# Patient Record
Sex: FEMALE | Race: WHITE | NOT HISPANIC OR LATINO | Employment: FULL TIME | URBAN - METROPOLITAN AREA
[De-identification: names, ages, dates, MRNs, and addresses within clinical notes are randomized per-mention and may not be internally consistent; named-entity substitution may affect disease eponyms.]

---
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
## 2019-08-15 ENCOUNTER — OFFICE VISIT (OUTPATIENT)
Dept: OPHTHALMOLOGY | Facility: CLINIC | Age: 19
End: 2019-08-15
Attending: OPHTHALMOLOGY
Payer: COMMERCIAL

## 2019-08-15 DIAGNOSIS — H55.01 CONGENITAL NYSTAGMUS: ICD-10-CM

## 2019-08-15 DIAGNOSIS — L56.8 PHOTOSENSITIVITY DUE TO SUN: ICD-10-CM

## 2019-08-15 DIAGNOSIS — Q14.1 CONGENITAL HYPOPLASIA OF FOVEA CENTRALIS: ICD-10-CM

## 2019-08-15 DIAGNOSIS — H50.10 MONOCULAR EXOTROPIA: ICD-10-CM

## 2019-08-15 DIAGNOSIS — H54.3 LOW VISION, BOTH EYES: ICD-10-CM

## 2019-08-15 DIAGNOSIS — E70.321 OCA2 (OCULOCUTANEOUS ALBINISM TYPE 2) (H): Primary | ICD-10-CM

## 2019-08-15 PROCEDURE — G0463 HOSPITAL OUTPT CLINIC VISIT: HCPCS | Mod: ZF | Performed by: TECHNICIAN/TECHNOLOGIST

## 2019-08-15 ASSESSMENT — REFRACTION_WEARINGRX
SPECS_TYPE: PAL
OD_ADD: +3.50
OD_CYLINDER: +2.25
OS_CYLINDER: +4.00
OD_SPHERE: +0.50
OS_AXIS: 080
OD_AXIS: 100
OS_ADD: +3.50
OS_SPHERE: +0.25

## 2019-08-15 ASSESSMENT — VISUAL ACUITY
CORRECTION_TYPE: GLASSES
OD_SC+: -1
OD_CC: 20/50
OS_CC+: -2
OS_SC: 20/100
OD_CC+: +2
OD_SC: 20/70
METHOD: SNELLEN - LINEAR
OS_CC: 20/60

## 2019-08-15 NOTE — NURSING NOTE
Chief Complaint(s) and History of Present Illness(es)     Albinism Follow Up     Laterality: both eyes    Comments: Here for DMV form to be filled out, no VA changes, WGFT,  No new concerns

## 2019-08-15 NOTE — PROGRESS NOTES
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 Follow up as planned with Dr. Barajas in May 2020

## 2019-08-23 ENCOUNTER — TELEPHONE (OUTPATIENT)
Dept: OPHTHALMOLOGY | Facility: CLINIC | Age: 19
End: 2019-08-23

## 2019-08-23 NOTE — TELEPHONE ENCOUNTER
Last glasses Rx faxed to [facility] shop per request 8-23-19  Baljeet Flores RN RN 4:10 PM 08/23/19

## 2020-05-21 ENCOUNTER — TELEPHONE (OUTPATIENT)
Dept: OPHTHALMOLOGY | Facility: CLINIC | Age: 20
End: 2020-05-21

## 2020-06-04 ENCOUNTER — OFFICE VISIT (OUTPATIENT)
Dept: OPHTHALMOLOGY | Facility: CLINIC | Age: 20
End: 2020-06-04
Payer: COMMERCIAL

## 2020-06-04 DIAGNOSIS — E70.321 OCA2 (OCULOCUTANEOUS ALBINISM TYPE 2) (H): Primary | ICD-10-CM

## 2020-06-04 DIAGNOSIS — H54.3 LOW VISION, BOTH EYES: ICD-10-CM

## 2020-06-04 DIAGNOSIS — H52.223 REGULAR ASTIGMATISM OF BOTH EYES: ICD-10-CM

## 2020-06-04 DIAGNOSIS — H21.233 IRIS TRANSILLUMINATION OF BOTH EYES: ICD-10-CM

## 2020-06-04 DIAGNOSIS — H50.10 MONOCULAR EXOTROPIA: ICD-10-CM

## 2020-06-04 DIAGNOSIS — H55.01 CONGENITAL NYSTAGMUS: ICD-10-CM

## 2020-06-04 ASSESSMENT — REFRACTION_WEARINGRX
OS_CYLINDER: +4.00
OD_CYLINDER: +2.25
OD_SPHERE: +0.50
OD_AXIS: 100
OD_ADD: +3.50
OS_AXIS: 080
OS_SPHERE: +0.25
OS_ADD: +3.50
SPECS_TYPE: SVL

## 2020-06-04 ASSESSMENT — VISUAL ACUITY
METHOD: SNELLEN - LINEAR
CORRECTION_TYPE: GLASSES
OS_CC+: +
OD_CC: 20/60
OD_CC+: +
OS_CC: 20/70

## 2020-06-04 ASSESSMENT — CONF VISUAL FIELD
OD_NORMAL: 1
METHOD: COUNTING FINGERS
OS_NORMAL: 1

## 2020-06-04 ASSESSMENT — REFRACTION_MANIFEST
OD_CYLINDER: +2.25
OS_ADD: +3.50
OD_ADD: +3.50
OD_SPHERE: +0.25
OS_SPHERE: PLANO
OS_AXIS: 080
OD_AXIS: 100
OS_CYLINDER: +4.00

## 2020-06-04 ASSESSMENT — TONOMETRY
IOP_METHOD: ICARE
OD_IOP_MMHG: 18
OS_IOP_MMHG: 19

## 2020-06-04 ASSESSMENT — SLIT LAMP EXAM - LIDS
COMMENTS: BLOND
COMMENTS: BLOND

## 2020-06-04 ASSESSMENT — CUP TO DISC RATIO
OD_RATIO: 0.1
OS_RATIO: 0.1

## 2020-06-04 NOTE — PROGRESS NOTES
History  HPI     Annual Eye Exam     In both eyes.  Since onset it is stable.  Treatments tried include no treatments.  Pain was noted as 0/10.              Comments     Patient states vision has been stable since last eye exam, both eyes. Denies eye pain or irritation. Does not take eye drops.    Luda Beckett COT 7:58 AM June 4, 2020     Requesting DMV forms          Last edited by Silviano Berg, OD on 6/4/2020  8:30 AM. (History)          Assessment/Plan  (E70.321) OCA2 (oculocutaneous albinism type 2) (H)  (primary encounter diagnosis)  Comment: Has 1 mutation in the P gene; Autosomal recessive  inheritance  Plan:  Educated patient on condition and clinical findings. Continue to monitor annually.    (H52.223) Regular astigmatism of both eyes  Comment: Hyperopic astigmatism both eyes   Plan: REFRACTION         Dispensed spectacle prescription for full time wear, bifocal maintained at previous power. Monitor annually.    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 Monitor annually.    (H50.10) Monocular exotropia - Left Eye  Comment: Asymptomatic, not bothered by cosmesis  Plan:  Monitor annually.    (H54.3) Low vision, both eyes - Both Eyes  Comment: RE: 20/50, LE:  20/60, BE: 20/50   Completed DMV form with restriction to 55 miles per hour, no distance restriction, no daylight restriction   No devices used, reports good vision at near with bifocals.  Plan:  Interested in pursuing trade school for plumbing. Discussed vocational bifocal and good lighting. Monitor annually.    (H55.01) Congenital nystagmus - Both Eyes  Comment: Asymptomatic, no compensatory head tily  Plan:  Monitor annually.    Return to clinic in 1 year for comprehensive eye exam.    Complete documentation of historical and exam elements from today's encounter can  be found in  the full encounter summary report (not reduplicated in this progress  note). I personally obtained the chief complaint(s) and history of present illness. I  confirmed and edited as necessary the review of systems, past medical/surgical  history, family history, social history, and examination findings as documented by  others; and I examined the patient myself. I personally reviewed the relevant tests,  images, and reports as documented above. I formulated and edited as necessary the  assessment and plan and discussed the findings and management plan with the  patient and family.    Silviano Berg OD, FAAO

## 2020-06-04 NOTE — NURSING NOTE
Chief Complaints and History of Present Illnesses   Patient presents with     Annual Eye Exam     Chief Complaint(s) and History of Present Illness(es)     Annual Eye Exam     Laterality: both eyes    Course: stable    Treatments tried: no treatments    Pain scale: 0/10              Comments     Patient states vision has been stable since last eye exam, both eyes. Denies eye pain or irritation. Does not take eye drops.    Luda Beckett COT 7:58 AM June 4, 2020

## 2020-08-28 ENCOUNTER — TELEPHONE (OUTPATIENT)
Dept: OPHTHALMOLOGY | Facility: CLINIC | Age: 20
End: 2020-08-28

## 2020-08-28 NOTE — TELEPHONE ENCOUNTER
Spoke to pt at 1115    Reviewed vision 20/50 with new corrective lenses each eye and would qualify for higher speed limit    Pt states DMV stated 50 miles/hr limit     Reviewed form with patient and patient states the box on form that was checked was with PRESENT glasses and not with NEW Corrective lenses    Will forward to Dr. Berg/faciltiator to update a form and contact pt to review good time to /sign form     Should be able to update form and check with new corrective lenses     Baljeet Flores RN 11:17 AM 08/28/20          [facility]    Phone Message    May a detailed message be left on voicemail: yes     Reason for Call: Other: Pt said she is not sure what to do but would like to get a message to Dr Berg because she said DMV has that her max speed she can go is 60 and it should be 65 not sure if she needs a letter from Dr Berg but she would like to discuss with care team  Please call Pt back to discuss  Thank you,    Action Taken: Message routed to:  [facility]    Travel Screening: Not Applicable

## 2020-08-31 NOTE — TELEPHONE ENCOUNTER
Updated form denoting that with the NEW glasses, Meño should be limited to 55 mph, (rather than the 50 mph which would be recommended in the current glasses).  No distance limitation or daylight restriction recommended.  The form was mailed to the patient's home address

## 2021-10-12 ENCOUNTER — TELEPHONE (OUTPATIENT)
Dept: OPHTHALMOLOGY | Facility: CLINIC | Age: 21
End: 2021-10-12

## 2021-10-12 NOTE — TELEPHONE ENCOUNTER
Unable to reach patient or LVM regarding DMV Form due to the number disconnects with no VMBox.     (Form to be completed for the DMV, the exam must have occurred within the last 6 months. So if patient needs a form completed,  would need to see patient back.)

## 2021-10-12 NOTE — TELEPHONE ENCOUNTER
Spoke with patient's mother, Kayla, regarding DMV Form. (Form to be completed for the DMV, the exam must have occurred within the last 6 months. So if patient needs a form completed,  would need to see patient back.) . Patient's mother will have patient call regarding scheduling. -Per Patient's Mother Kayla

## 2021-10-12 NOTE — TELEPHONE ENCOUNTER
Unable to reach patient's parent or LVM regarding DMV Form due to the number disconnects with no VMBox.     (Form to be completed for the DMV, the exam must have occurred within the last 6 months. So if patient needs a form completed,  would need to see patient back.)

## 2021-12-14 ENCOUNTER — TELEPHONE (OUTPATIENT)
Dept: OPHTHALMOLOGY | Facility: CLINIC | Age: 21
End: 2021-12-14
Payer: COMMERCIAL

## 2021-12-14 NOTE — TELEPHONE ENCOUNTER
[facility] Call Center    Phone Message    May a detailed message be left on voicemail: yes     Reason for Call: Form or Letter   Type or form/letter needing completion: Vision Report   Provider: Dr. Silviano Berg  Date form needed: ASAP    Patient needs most recent Vision Report for this year so, she can give it to DMV.    If clinic knows Fax or has form can you send to there/DMV?    Please mail a copy to patient as well    Once completed: Mail form to Name: Patient, at Address: same as chart      Action Taken: Message routed to:  [facility] [[id number]]    Travel Screening: Not Applicable

## 2022-01-06 ENCOUNTER — TELEPHONE (OUTPATIENT)
Dept: OPHTHALMOLOGY | Facility: CLINIC | Age: 22
End: 2022-01-06
Payer: COMMERCIAL

## 2022-01-06 NOTE — TELEPHONE ENCOUNTER
I returned the patient's call.  She needs a sooner DMV form filled out.  Able to schedule next week.  The patient is aware of location/date.

## 2022-01-06 NOTE — TELEPHONE ENCOUNTER
[facility] Call [facility]    Phone Message    May a detailed message be left on voicemail: yes     Reason for Call: Other: Pt called stating she has been trying to get a vision report for the DMV for over a month now with no success. She was never told she needed an Appt in December and now the DMV told her that her driving privileges will be taken away on January 15th without the vision report. 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 Her Appt on Jan 29th is too late per Pt. Please call and discuss with Pt scheduling options. Thank you.      Action Taken: Message routed to:  [facility]    Travel Screening: Not Applicable

## 2022-01-11 NOTE — PROGRESS NOTES
HPI  Meño Loaiza is a 21 year old female here for comprehensive eye exam.  Vision is stable.  Needs dmv form filled out. Using hats and sunglasses as before to manage photophobia.      PMH:    Past Medical History:   Diagnosis Date     Nystagmus      OCA2 (oculocutaneous albinism type 2) (H)      Strabismus       POH:  OCA2, congential nystagmus, glasses for astigmatism, left exotropia, no surgery, no trauma  Oc Meds:  none  FH:  Sister nystagmus      Assessment & Plan      (E70.321) OCA2 (oculocutaneous albinism type 2) (H)  (primary encounter diagnosis)  Comment: Has 1 mutation in the P gene; Autosomal recessive  inheritance  Plan:  Educated patient on stable condition and clinical findings. Continue to monitor q 1-2 yrs.    (H52.223) Regular astigmatism of both eyes  Comment: mild change, small improvement in best corrected visual acuity   Plan: manifest refraction done and prescription for glasses given, bifocal maintained at previous power.     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 Monitor annually.    (H50.10) Monocular exotropia - Left Eye  Comment: Asymptomatic, not bothered by cosmesis  Plan:  Monitor annually.    (H54.3) Low vision, both eyes - Both Eyes  Comment: RE: 20/50, LE:  20/60-2, BE: 20/50  Plan:  Completed DMV form with restriction to 55 miles per hour, no distance restriction, no daylight restriction.  No devices used, reports good vision at near with bifocals.    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   -----------------------------------------------------------------------------------    Patient disposition:   Return in about 18  months (around 7/12/2023) for Comprehensive Exam. Call for sooner appointment as needed.    Complete documentation of historical and exam elements from today's encounter can be found in the full encounter summary report (not reduplicated in this progress note). I personally obtained the chief complaint(s) and history of present illness.  I have confirmed and edited as necessary the CC, HPI, PMH/PSH, social history, FMH, ROS, and exam/neuro findings as obtained by the technician or others. I have examined this patient myself and I personally viewed the image(s) and studies listed above and the documentation reflects my findings and interpretation.  I formulated and edited as necessary the assessment and plan and discussed the findings and management plan with the patient and family.     Lyndsey Bradley MD

## 2022-01-12 ENCOUNTER — OFFICE VISIT (OUTPATIENT)
Dept: OPHTHALMOLOGY | Facility: CLINIC | Age: 22
End: 2022-01-12
Attending: OPHTHALMOLOGY
Payer: COMMERCIAL

## 2022-01-12 DIAGNOSIS — E70.321 OCA2 (OCULOCUTANEOUS ALBINISM TYPE 2) (H): Primary | ICD-10-CM

## 2022-01-12 DIAGNOSIS — H54.3 LOW VISION, BOTH EYES: ICD-10-CM

## 2022-01-12 DIAGNOSIS — H21.233 IRIS TRANSILLUMINATION OF BOTH EYES: ICD-10-CM

## 2022-01-12 DIAGNOSIS — H52.223 REGULAR ASTIGMATISM OF BOTH EYES: ICD-10-CM

## 2022-01-12 DIAGNOSIS — H50.10 MONOCULAR EXOTROPIA: ICD-10-CM

## 2022-01-12 PROCEDURE — 92004 COMPRE OPH EXAM NEW PT 1/>: CPT | Performed by: OPHTHALMOLOGY

## 2022-01-12 PROCEDURE — G0463 HOSPITAL OUTPT CLINIC VISIT: HCPCS | Mod: 25

## 2022-01-12 PROCEDURE — 92015 DETERMINE REFRACTIVE STATE: CPT

## 2022-01-12 PROCEDURE — 92081 LIMITED VISUAL FIELD XM: CPT | Performed by: OPHTHALMOLOGY

## 2022-01-12 ASSESSMENT — REFRACTION_WEARINGRX
OS_ADD: +3.50
OD_SPHERE: +0.25
OS_CYLINDER: +4.00
OD_SPHERE: +1.00
OD_CYLINDER: +2.25
OD_CYLINDER: +2.25
OD_ADD: +3.50
OS_CYLINDER: +4.00
OD_AXIS: 100
OS_SPHERE: PLANO
OS_AXIS: 080
SPECS_TYPE: SVL
OS_SPHERE: +0.50
OD_AXIS: 074
OS_AXIS: 086

## 2022-01-12 ASSESSMENT — TONOMETRY
IOP_METHOD: ICARE
OS_IOP_MMHG: 17
OD_IOP_MMHG: 19

## 2022-01-12 ASSESSMENT — REFRACTION_MANIFEST
OD_AXIS: 094
OS_SPHERE: -0.25
OD_ADD: +3.50
OD_SPHERE: PLANO
OS_AXIS: 077
OS_CYLINDER: +4.25
OS_ADD: +3.50
OD_CYLINDER: +2.50

## 2022-01-12 ASSESSMENT — CUP TO DISC RATIO
OS_RATIO: 0.1
OD_RATIO: 0.1

## 2022-01-12 ASSESSMENT — VISUAL ACUITY
METHOD: SNELLEN - LINEAR
OD_CC: 20/60
OS_CC+: -2
CORRECTION_TYPE: GLASSES
OS_CC: 20/70
OD_CC+: -2+2

## 2022-01-12 ASSESSMENT — CONF VISUAL FIELD
OS_NORMAL: 1
OD_NORMAL: 1
METHOD: COUNTING FINGERS

## 2022-01-12 ASSESSMENT — SLIT LAMP EXAM - LIDS
COMMENTS: BLOND
COMMENTS: BLOND

## 2022-01-12 NOTE — NURSING NOTE
Chief Complaints and History of Present Illnesses   Patient presents with     Annual Eye Exam     Chief Complaint(s) and History of Present Illness(es)     Annual Eye Exam     Laterality: both eyes    Course: stable    Associated symptoms: Negative for flashes, floaters and eye pain    Pain scale: 0/10              Comments     OCA2 (oculocutaneous albinism type 2)  Pt states vision both eyes is stable since last visit.  Ocular meds: None    Rachel Salmon OT 2:33 PM January 12, 2022

## 2022-02-04 ENCOUNTER — TELEPHONE (OUTPATIENT)
Dept: OPHTHALMOLOGY | Facility: CLINIC | Age: 22
End: 2022-02-04
Payer: COMMERCIAL

## 2022-02-04 NOTE — TELEPHONE ENCOUNTER
Glasses rx sent over to [facility] in [place] ([phone]) also called [facility] to let them know prescription was being sent so they may tell the patient if still waiting.  CLINT Mitchell 2/4/2022 3:13 PM

## 2022-02-04 NOTE — TELEPHONE ENCOUNTER
[facility] Call [facility]    Phone Message    May a detailed message be left on voicemail: yes     Reason for Call: Other: Pt called in needing her glasses rx sent over to [facility] in . P [phone] F [phone]. Pt is there now wanting to get glasses. Thank you     Action Taken: Message routed to:  [facility]    Travel Screening: Not Applicable

## 2024-01-10 ENCOUNTER — OFFICE VISIT (OUTPATIENT)
Dept: OPHTHALMOLOGY | Facility: CLINIC | Age: 24
End: 2024-01-10
Payer: COMMERCIAL

## 2024-01-10 DIAGNOSIS — H21.233 IRIS TRANSILLUMINATION OF BOTH EYES: ICD-10-CM

## 2024-01-10 DIAGNOSIS — H54.3 LOW VISION, BOTH EYES: ICD-10-CM

## 2024-01-10 DIAGNOSIS — H50.10 MONOCULAR EXOTROPIA: ICD-10-CM

## 2024-01-10 DIAGNOSIS — E70.321 OCA2 (OCULOCUTANEOUS ALBINISM TYPE 2) (H): Primary | ICD-10-CM

## 2024-01-10 DIAGNOSIS — H52.223 REGULAR ASTIGMATISM OF BOTH EYES: ICD-10-CM

## 2024-01-10 PROCEDURE — 92081 LIMITED VISUAL FIELD XM: CPT | Performed by: OPTOMETRIST

## 2024-01-10 PROCEDURE — 92015 DETERMINE REFRACTIVE STATE: CPT | Performed by: OPTOMETRIST

## 2024-01-10 PROCEDURE — 92004 COMPRE OPH EXAM NEW PT 1/>: CPT | Performed by: OPTOMETRIST

## 2024-01-10 ASSESSMENT — REFRACTION_WEARINGRX
OS_AXIS: 080
OD_AXIS: 093
OD_ADD: +3.50
OS_SPHERE: PLANO
OS_ADD: +3.50
OD_SPHERE: PLANO
OS_CYLINDER: +3.75
OD_CYLINDER: +2.50

## 2024-01-10 ASSESSMENT — REFRACTION_MANIFEST
OD_ADD: +3.50
OS_AXIS: 080
OS_SPHERE: -0.50
OD_CYLINDER: +2.50
OD_SPHERE: -0.50
OS_CYLINDER: +3.75
OD_AXIS: 093
OS_ADD: +3.50

## 2024-01-10 ASSESSMENT — CONF VISUAL FIELD
OS_SUPERIOR_TEMPORAL_RESTRICTION: 0
OS_INFERIOR_TEMPORAL_RESTRICTION: 0
OD_INFERIOR_TEMPORAL_RESTRICTION: 0
OS_SUPERIOR_NASAL_RESTRICTION: 0
OD_NORMAL: 1
OS_INFERIOR_NASAL_RESTRICTION: 0
OS_NORMAL: 1
METHOD: COUNTING FINGERS
OD_SUPERIOR_TEMPORAL_RESTRICTION: 0
OD_INFERIOR_NASAL_RESTRICTION: 0
OD_SUPERIOR_NASAL_RESTRICTION: 0

## 2024-01-10 ASSESSMENT — VISUAL ACUITY
OD_SC: 20/70
OS_CC: J2+2
OD_CC: J1+ -2
OS_CC: 20/70
OD_CC: 20/60
OS_SC: 20/80
OS_CC+: +2
OD_CC+: +2
METHOD: SNELLEN - LINEAR

## 2024-01-10 ASSESSMENT — CUP TO DISC RATIO
OD_RATIO: 0.1
OS_RATIO: 0.1

## 2024-01-10 ASSESSMENT — TONOMETRY
OS_IOP_MMHG: 18
OD_IOP_MMHG: 20
IOP_METHOD: ICARE

## 2024-01-10 ASSESSMENT — SLIT LAMP EXAM - LIDS
COMMENTS: BLOND
COMMENTS: BLOND

## 2024-01-10 NOTE — PROGRESS NOTES
History  HPI    Here for CE and DMV form to be filled out, MARY: 2021 currently wears glasses all the time. Noted stable blurry vision same to last eye exam. Light sensitivity. Denies diplopia, dry eyes     Hx OCA2  Last edited by Jayesh Daley on 1/10/2024  1:36 PM.          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   Plan: [place] DMV      Educated patient on condition and clinical findings. Completed DMV form with restriction to 55 miles per hour, no distance restriction, no daylight restriction. Continue to monitor annually.    (H50.10) Monocular exotropia - Left Eye  Comment: Patient has recently become aware and is interested in pursuing treatment  Plan:  Referred to Dr. Barajas for strabismus consultation.    (H52.223) Regular astigmatism of both eyes  Comment: Mixed astigmatism both eyes, stable  Plan: REFRACTION   Dispensed spectacle prescription for full time wear. Monitor annually.    Complete documentation of historical and exam elements from today's encounter can  be found in the full encounter summary report (not reduplicated in this progress  note). I personally obtained the chief complaint(s) and history of present illness. I  confirmed and edited as necessary the review of systems, past medical/surgical  history, family history, social history, and examination findings as documented by  others; and I examined the patient myself. I personally reviewed the relevant tests,  images, and reports as documented above. I formulated and edited as necessary the  assessment and plan and discussed the findings and management plan with the  patient and family.    Silviano Berg, SHAMAR, FAAO

## 2024-01-20 ENCOUNTER — HEALTH MAINTENANCE LETTER (OUTPATIENT)
Age: 24
End: 2024-01-20

## 2024-02-14 NOTE — PROGRESS NOTES
Chief Complaints and History of Present Illnesses   Patient presents with     Albinism Follow Up     h/o OCA2. Wearing glasses full time, vision and nystagmus is stable per patient. Needs DMV form filled out, has permit and has been unable to get license. Drives mostly during the day, not as comfortable with dimmer light. XT not noticed by patient or others. Wears hat when outside in bright sun.      Review of systems for the eyes was negative other than the pertinent positives and negatives noted in the HPI.   History is obtained from the patient         CC:  f/u albinism (OCA2 with 1 mutation in the P gene)  HPI:  Glasses:  wears full-time; likes bifocal  Vision:  problem at distance  Photosensitivity:   Uses Transitions lenses, cap, hat   closes his left eye or both eyes with bright light  Sunscreen used, no sunburn  Strabismus not noted  Abnormal head posture not noted  Nystagmus stable  At [employer], and making good grades; will transfer to [employer] next year  No new medical problems      Assessment & Plan    Meño Loaiza is a 18 year old female who presents with:     OCA2 (oculocutaneous albinism type 2) (H)  Has 1 mutation in the P gene  Autosomal recessive  inheritance    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    Regular astigmatism, bilateral  Greater in the left eye  New glasses prescription given  Continue with bifocal and Transitions lenses    Monocular exotropia - Left Eye  Left exotropia measuring 12 prism diopters at distance and 15 prism diopters at near  Will monitor    Low vision, both eyes - Both Eyes  RE: 20/50  LE:  20/70  BE: 20/50  With new glasses prescription  Completed DMV form with restriction to 50 miles per hour    Congenital nystagmus - Both Eyes  Related to albinism    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  Return in about 2 years (around 5/30/2020) for Dr. Lamar Barajas.   Patient Instructions   New glasses prescription   DMV form completed    Thanks for allowing me to provide your eye care.  I wish you all the best in life.    RAVINDRA Morrison MD        Attending Physician Attestation:  Complete documentation of historical and exam elements from today's encounter can be found in the full encounter summary report (not reduplicated in this progress note).  I personally obtained the chief complaint(s) and history of present illness.  I confirmed and edited as necessary the review of systems, past medical/surgical history, family history, social history, and examination findings as documented by others; and I examined the patient myself.  I personally reviewed the relevant tests, images, and reports as documented above.  I formulated and edited as necessary the assessment and plan and discussed the findings and management plan with the patient and family. - RAVINDRA Morrison MD        " 2-3x/week

## 2024-02-28 ENCOUNTER — OFFICE VISIT (OUTPATIENT)
Dept: OPHTHALMOLOGY | Facility: CLINIC | Age: 24
End: 2024-02-28
Attending: OPHTHALMOLOGY
Payer: COMMERCIAL

## 2024-02-28 ENCOUNTER — TELEPHONE (OUTPATIENT)
Dept: OPHTHALMOLOGY | Facility: CLINIC | Age: 24
End: 2024-02-28
Payer: COMMERCIAL

## 2024-02-28 DIAGNOSIS — H21.233 IRIS TRANSILLUMINATION OF BOTH EYES: ICD-10-CM

## 2024-02-28 DIAGNOSIS — H50.10 MONOCULAR EXOTROPIA: Primary | ICD-10-CM

## 2024-02-28 DIAGNOSIS — H54.52A1: ICD-10-CM

## 2024-02-28 DIAGNOSIS — E70.321 OCA2 (OCULOCUTANEOUS ALBINISM TYPE 2) (H): ICD-10-CM

## 2024-02-28 PROCEDURE — 99214 OFFICE O/P EST MOD 30 MIN: CPT | Performed by: OPHTHALMOLOGY

## 2024-02-28 PROCEDURE — 99213 OFFICE O/P EST LOW 20 MIN: CPT | Performed by: OPHTHALMOLOGY

## 2024-02-28 PROCEDURE — 92060 SENSORIMOTOR EXAMINATION: CPT | Performed by: OPHTHALMOLOGY

## 2024-02-28 ASSESSMENT — SLIT LAMP EXAM - LIDS
COMMENTS: BLOND LASHES
COMMENTS: BLOND LASHES

## 2024-02-28 ASSESSMENT — REFRACTION_WEARINGRX
OS_SPHERE: -0.50
OS_ADD: +3.50
OS_CYLINDER: +4.00
OD_ADD: +3.50
OD_CYLINDER: +2.50
OD_AXIS: 095
SPECS_TYPE: BIFOCAL
OS_AXIS: 080
OD_SPHERE: -0.50

## 2024-02-28 ASSESSMENT — VISUAL ACUITY
OS_CC: 20/100
OD_CC+: +
CORRECTION_TYPE: GLASSES
METHOD: SNELLEN - LINEAR
OS_CC+: +
OD_CC: 20/60

## 2024-02-28 ASSESSMENT — EXTERNAL EXAM - RIGHT EYE: OD_EXAM: BLOND HAIR

## 2024-02-28 NOTE — LETTER
"2/28/2024    To: Silviano Berg, OD  [address] [address]    Re:  Meño Loaiza    YOB: 2000    MRN: [id number]    Dear Colleague,     It was my pleasure to see Meño on 2/28/2024.  In summary, Meño Loaiza is a 24 year old female who presents with:     Monocular exotropia  OCA2 (oculocutaneous albinism type 2) (H24)  Low vision, both eyes  Iris transillumination of both eyes    Referred by Dr. Berg for left exotropia repair consultation. Meño is bothered by the psychosocial impact of her constant left exotropia and desires surgical repair. Large exotropia secondary to positive angle kappa with moderate angle on alternating cover testing. Tolerates 20 base in prism correction before is diplopic.   - I recommend eye muscle surgery. Today with Meño, I reviewed the indications, risks, benefits, and alternatives of eye muscle surgery including, but not limited to, failure obtain the desired ocular alignment (\"over\" or \"under\" correction), diplopia, and damage to any structure in or around the eye that may necessitate treatment with medicine, laser, or surgery. I further explained that the goal of surgery is to help control Meño's strabismus. Surgery will not \"cure\" Meño's strabismus or resolve/prevent the need for refractive correction. Additional strabismus surgery may be required in the short or long term. I emphasized that regular follow-up to monitor and optimize her vision and alignment would be necessary. I also discussed that trainees would be involved in Meño's surgery under my direct supervision. We also discussed the risks of surgical injury, bleeding, and infection which may necessitate further medical or surgical treatment and which may result in diplopia, loss of vision, blindness, or loss of the eye(s) in less than 1% of cases and the remote possibility of permanent damage to any organ system or death with the use of general anesthesia.  I explained that " we would hide visible scars as much as possible in natural creases but that every patient heals and pigments differently resulting in a variable degree of scarring to the eyes or surrounding facial structures after surgery.  I provided multiple opportunities for questions, answered all questions to the best of my ability, and confirmed that my answers and my discussion were understood.  Plan for bilateral lateral rectus recession.   Reassess at preop if we can push her correction closer to her krimsky angle.   - Provided ack lab survey information (was previously involved with [facility] study but nystagmus limited participation).      Thank you for the opportunity to care for Meño. I have asked her to Return for Schedule Surgery.  Until then, please do not hesitate to contact me or my clinic with any questions or concerns.          Warm regards,          Lamar Barajas MD                 Pediatric Ophthalmology & Strabismus        Department of Ophthalmology & Visual Neurosciences        [facility]   CC:  MD Meño Gongora

## 2024-02-28 NOTE — NURSING NOTE
Chief Complaint(s) and History of Present Illness(es)       Albinism Follow Up              Treatments tried: glasses    Comments: Recently updated Rx after LV with Dr. Berg, no VA concerns               Exotropia Follow Up              Laterality: left eye    Onset: present since childhood    Treatments tried: glasses    Comments: Interested in strab sx for LXT, alignment seems stable               Comments    History of Albinism:  Photosensitivity:  Always  Filtering glasses/cap: Always  Nystagmus: yes, manifest   Visual development: Abnormal: 20/50  Holds near objects closely: Yes  Head posture:  Normal  Hair color at birth: light blonde   Gene testing:  OCA2  Tan line: No  Use of sunscreen: Yes  History of bruising/easy bleeding/epistaxis: No  Ethnicity:  White     Inf pt

## 2024-02-28 NOTE — PROGRESS NOTES
"Chief Complaint(s) and History of Present Illness(es)       Albinism Follow Up    Treatments tried include glasses. Additional comments: Recently updated Rx after LV with Dr. Berg, no [facility] concerns              Exotropia Follow Up    In left eye.  Disease is present since childhood.  Treatments tried include glasses. Additional comments: Interested in strab sx for LXT, alignment seems stable              Comments    History of Albinism:  Photosensitivity:  Always  Filtering glasses/cap: Always  Nystagmus: yes, manifest   Visual development: Abnormal: 20/50  Holds near objects closely: Yes  Head posture:  Normal  Hair color at birth: light blonde   Gene testing:  OCA2  Tan line: No  Use of sunscreen: Yes  History of bruising/easy bleeding/epistaxis: No  Ethnicity:  White     Inf pt                  Review of systems for the eyes was negative other than the pertinent positives and negatives noted in the HPI.    History is obtained from the patient.     Primary care: Braden Ahn   Referring provider: No ref. provider found  [facility] home  Assessment & Plan   Meño Loaiza is a 24 year old female who presents with:     Monocular exotropia  OCA2 (oculocutaneous albinism type 2) (H24)  Low vision, both eyes  Iris transillumination of both eyes    Referred by Dr. Berg for left exotropia repair consultation. Meño is bothered by the psychosocial impact of her constant left exotropia and desires surgical repair. Large exotropia secondary to positive angle kappa with moderate angle on alternating cover testing. Tolerates 20 base in prism correction before is diplopic.   - I recommend eye muscle surgery. Today with Meño, I reviewed the indications, risks, benefits, and alternatives of eye muscle surgery including, but not limited to, failure obtain the desired ocular alignment (\"over\" or \"under\" correction), diplopia, and damage to any structure in or around the eye that may necessitate treatment with " "medicine, laser, or surgery. I further explained that the goal of surgery is to help control Meño's strabismus. Surgery will not \"cure\" Meño's strabismus or resolve/prevent the need for refractive correction. Additional strabismus surgery may be required in the short or long term. I emphasized that regular follow-up to monitor and optimize her vision and alignment would be necessary. I also discussed that trainees would be involved in Meño's surgery under my direct supervision. We also discussed the risks of surgical injury, bleeding, and infection which may necessitate further medical or surgical treatment and which may result in diplopia, loss of vision, blindness, or loss of the eye(s) in less than 1% of cases and the remote possibility of permanent damage to any organ system or death with the use of general anesthesia.  I explained that we would hide visible scars as much as possible in natural creases but that every patient heals and pigments differently resulting in a variable degree of scarring to the eyes or surrounding facial structures after surgery.  I provided multiple opportunities for questions, answered all questions to the best of my ability, and confirmed that my answers and my discussion were understood.  Plan for bilateral lateral rectus recession.   Reassess at preop if we can push her correction closer to her krimsky angle.   - Provided [facility] survey information (was previously involved with [facility] study but nystagmus limited participation).        Return for Schedule Surgery.    Patient Instructions   Information on Research for People with Albinism: [facility] - Online survery: [email or web address]      EYE MUSCLE SURGERY  Planning for: bilateral lateral rectus recession      What is strabismus? Strabismus is the medical term for eye muscle incoordination, resulting in either crossed eyes, wandering eyes, or drifting eyes. There are many types of strabismus, and Dr. Barajas and " her team are experts in diagnosing each particular type. Strabismus may cause lack of depth perception, decreased visual field, eye strain, or diplopia (double vision). Other treatments for strabismus include glasses, eye drops, eye muscle exercises, or medical injections; however, if none of these treatments are appropriate or effective for you or your child, surgical correction may be necessary.     What causes strabismus? The cause of strabismus may be poor vision in one or both eyes, paralysis, or weakness of one or more of the eye muscles, scars or injuries to the eye muscles, or (most common) a basic incoordination problem resulting from a weakness in the area of the brain that is responsible for coordination of eye movements. Strabismus surgery in most cases improves the strength and coordination of the eye muscles, but in many cases does not result in a complete cure in the sense that the eyes may not coordinate perfectly in all directions of gaze.     Will surgery correct strabismus? In most cases, surgical treatment of strabismus will result in considerable improvement of the incoordination problem. Seventy percent of patients who have surgery with Dr. Barajas for strabismus will experience significant improvement such that no further surgery is required. About 10% of patients may have incomplete correction in the short term and, in some of these patients, it may be significant enough to require additional surgical correction 3-6 months after the first surgery. About 20-30% of children have very good eye alignment within a few months after surgery but the eyes may drift again over time: months, years, or decades later. This too may require another surgery. Sometimes residual misalignment after surgery can be improved by other treatments.      How do you decide which muscles (which eye) to operate on? The doctor considers several factors, including the alignment of the eyes in different directions of looking  as measured in the office, muscles that are underacting or overacting, and previous surgeries that have been performed. Sometimes it is necessary to operate on  the good eye  to make sure that the eyes remain balanced. Inevitably, the surgical consent will be for BOTH eyes so that Dr. Barajas can test all eye muscles under anesthesia and operate accordingly to give the patient the best possible outcome.     What kind of anesthesia is used? All children have surgery under general anesthesia, meaning that they are completely asleep for the surgery. General anesthesia is begun by breathing medicine from a mask, or by receiving medicine through a small tube that is placed in a blood vessel. All patients receive a tube in the vein, but it is placed after anesthesia is begun with a mask for children who are afraid of needles before they are sleeping. Young children sometimes receive medicine in the Pre-Anesthesia Room, to help them accept the anesthesia more easily. During anesthesia, a tube will be placed in or on the patient's airway (endotracheal tube or larygeal mask airway) for safety. Heart rate and rhythm, breathing rate, blood pressure, oxygen level, and level of anesthetic medicines are constantly monitored by the anesthesia team. Feel free to address any concerns that you have about anesthesia with the anesthesiologist who will be talking with you before surgery. Some adults may have local anesthesia, with medicine placed around the eyeball to numb it.      What should I expect after surgery?    All sutures are dissolvable.  In almost all cases, an eye patch or bandage is not required after surgery.  Sensitivity to light, blurry vision, double vision, foreign body sensation (feeling like the eyes have something in them or are scratchy), aching or sore eyes especially with movement, bloodstained orange/red tears and crusting along the eyelashes are all normal after surgery. These will be the worst for the first  24-48 hours after surgery. As a result, some patients will elect to keep their eyes closed for 1-3 days after surgery. This is normal. Whenever Meño is comfortable, she may open his eyes.    Movies, tablets, and phones may be watched anytime. If glasses are worn, it is ok to keep them off while the eyes are resting and resume wear once the patient is comfortably opening the eyes again in a few days.   Avoid eye pressure, rubbing, straining, and athletics for 1 week. (Don't worry, Dr. Barajas has never seen a child pop a stitch or cause harm despite some inevitable rubbing.)   It is normal for the white part of the eyes to be red/orange/purple and puffy or gelatinous like a gummy bear on the surface of the eye. This is just a bruise and will fade away slowly over a few weeks.   To prevent infection, it is important to keep  dirty  water, sand, and dirt out of the eye after surgery. So, no swimming (lakes or pools), sand, or dirt in the eyes for 2 weeks after surgery. Bathe or shower as usual.  The  muscle ache  discomfort experienced after eye muscle surgery improves significantly over the first 2 to 3 days after surgery. Young children may receive Tylenol or ibuprofen in the usual doses if they seem uncomfortable or irritable. Cool washcloths or ice placed over the eyes can be soothing. Activity is limited only by the individual patient's level of comfort.   An antibiotic eye drop or ointment may be prescribed to use for 1 week after surgery.  Scars are nature's way of healing a surgical wound. The scars are not usually noticeable, unless more than one surgery is required. Techniques are used at the time of surgery to minimize scarring. Scars are located in the thin conjunctiva covering the white of the eye, and are not on the skin of the eyelid.  Meño may return to /school/work whenever comfortable. Surgery is generally on a Thursday. Most patients return on the Monday after surgery.   It takes 1-2 months  "for the eye muscles to fully regain their strength, for the brain to figure out the new system, and for the eye alignment to normalize. During this time, Meño may experience double vision (\"I see 2 mommies/daddies\") and some unsteadiness. After surgery, the eyes may appear to wander in any direction (in, out, up, or down). This is normal and will gradually improve each day. It is hard to wait, but trust that it will improve with time. If Meño is complaining of double vision past 3 weeks after surgery please call Dr. Barajas's clinic to discuss with her team.      Will another surgery be needed?  While every attempt is made to correct the misalignment with just one surgery, more than one surgery may be required.  This is related to the individual's healing after muscle surgery, and other types of misalignment of the eyes that may develop in the future. There is no specific number of surgeries beyond which additional surgeries cannot be performed. There is no specific age beyond which eye muscle surgery cannot be performed.     What are the risks of strabismus surgery? The most common  complication from eye muscle surgery is an under-correction or over-correction of the misalignment that requires additional surgery (on average, about 1 out of 3 patients will need another operation at some time in their life). Other very rare complications include bleeding, infection in the eye, or damage to any structure in or around the eye. These are uncommon, and most often easily treated with no long-term impact to vision. Less than 1% of the time, they could result in permanent loss of vision, blindness, or loss of the eye. This is considered very safe. For context, statistically, you are less safe driving on the highway for 1-2 hours. In addition, surgery may expose the patient to other rare complications such as a reaction to anesthesia (again less than 1% of the time). The anesthesiologist will review these risks prior to " "surgery. If adverse reactions occur, the situation will be handled in the best interest of the patient, even if surgery needs to be postponed.     Dr. Barajas's surgery scheduler, Shalonda, will contact you in the next few business days to schedule surgery. For questions, call [phone].      For a free and informative book on strabismus (eye misalignment disorders), go to: [email or web address]     For more information, see also: [email or web address].[email or web address].[email or web address]     I recommend eye muscle surgery. Today with Meño, I reviewed the indications, risks, benefits, and alternatives of eye muscle surgery including, but not limited to, failure obtain the desired ocular alignment (\"over\" or \"under\" correction), diplopia, and damage to any structure in or around the eye that may necessitate treatment with medicine, laser, or surgery. I further explained that the goal of surgery is to help control Meño's strabismus. Surgery will not \"cure\" Meño's strabismus or resolve/prevent the need for refractive correction. Additional strabismus surgery may be required in the short or long term. I emphasized that regular follow-up to monitor and optimize her vision and alignment would be necessary. We also discussed the risks of surgical injury, bleeding, and infection which may necessitate further medical or surgical treatment and which may result in diplopia, loss of vision, blindness, or loss of the eye(s) in less than 1% of cases and the remote possibility of permanent damage to any organ system or death with the use of general anesthesia.  I explained that we would hide visible scars as much as possible in natural creases but that every patient heals and pigments differently resulting in a variable degree of scarring to the eyes or surrounding facial structures after surgery.  I also discussed that trainees would be involved in Meño's surgery under my direct supervision.  I provided " multiple opportunities for questions, answered all questions to the best of my ability, and confirmed that my answers and my discussion were understood.                Visit Diagnoses & Orders    ICD-10-CM    1. Monocular exotropia  H50.10 Sensorimotor     Case Request: Bilateral strabismus surgery      2. OCA2 (oculocutaneous albinism type 2) (H24)  E70.321       3. Category 1 low vision of left eye with normal vision of right eye  H54.52A1       4. Iris transillumination of both eyes  H21.233          Attending Physician Attestation:  Complete documentation of historical and exam elements from today's encounter can be found in the full encounter summary report (not reduplicated in this progress note).  I personally obtained the chief complaint(s) and history of present illness.  I confirmed and edited as necessary the review of systems, past medical/surgical history, family history, social history, and examination findings as documented by others; and I examined the patient myself.  I personally reviewed the relevant tests, images, and reports as documented above.  I formulated and edited as necessary the assessment and plan and discussed the findings and management plan with the patient and family. - Lamar Barajas MD

## 2024-02-28 NOTE — PATIENT INSTRUCTIONS
Information on Research for People with Albinism: [facility] [facility] - Online survery: [email or web address]      EYE MUSCLE SURGERY  Planning for: bilateral lateral rectus recession      What is strabismus? Strabismus is the medical term for eye muscle incoordination, resulting in either crossed eyes, wandering eyes, or drifting eyes. There are many types of strabismus, and Dr. Barajas and her team are experts in diagnosing each particular type. Strabismus may cause lack of depth perception, decreased visual field, eye strain, or diplopia (double vision). Other treatments for strabismus include glasses, eye drops, eye muscle exercises, or medical injections; however, if none of these treatments are appropriate or effective for you or your child, surgical correction may be necessary.     What causes strabismus? The cause of strabismus may be poor vision in one or both eyes, paralysis, or weakness of one or more of the eye muscles, scars or injuries to the eye muscles, or (most common) a basic incoordination problem resulting from a weakness in the area of the brain that is responsible for coordination of eye movements. Strabismus surgery in most cases improves the strength and coordination of the eye muscles, but in many cases does not result in a complete cure in the sense that the eyes may not coordinate perfectly in all directions of gaze.     Will surgery correct strabismus? In most cases, surgical treatment of strabismus will result in considerable improvement of the incoordination problem. Seventy percent of patients who have surgery with Dr. Barajas for strabismus will experience significant improvement such that no further surgery is required. About 10% of patients may have incomplete correction in the short term and, in some of these patients, it may be significant enough to require additional surgical correction 3-6 months after the first surgery. About 20-30% of children have very good eye alignment  within a few months after surgery but the eyes may drift again over time: months, years, or decades later. This too may require another surgery. Sometimes residual misalignment after surgery can be improved by other treatments.      How do you decide which muscles (which eye) to operate on? The doctor considers several factors, including the alignment of the eyes in different directions of looking as measured in the office, muscles that are underacting or overacting, and previous surgeries that have been performed. Sometimes it is necessary to operate on  the good eye  to make sure that the eyes remain balanced. Inevitably, the surgical consent will be for BOTH eyes so that Dr. Barajas can test all eye muscles under anesthesia and operate accordingly to give the patient the best possible outcome.     What kind of anesthesia is used? All children have surgery under general anesthesia, meaning that they are completely asleep for the surgery. General anesthesia is begun by breathing medicine from a mask, or by receiving medicine through a small tube that is placed in a blood vessel. All patients receive a tube in the vein, but it is placed after anesthesia is begun with a mask for children who are afraid of needles before they are sleeping. Young children sometimes receive medicine in the Pre-Anesthesia Room, to help them accept the anesthesia more easily. During anesthesia, a tube will be placed in or on the patient's airway (endotracheal tube or larygeal mask airway) for safety. Heart rate and rhythm, breathing rate, blood pressure, oxygen level, and level of anesthetic medicines are constantly monitored by the anesthesia team. Feel free to address any concerns that you have about anesthesia with the anesthesiologist who will be talking with you before surgery. Some adults may have local anesthesia, with medicine placed around the eyeball to numb it.      What should I expect after surgery?    All sutures are  dissolvable.  In almost all cases, an eye patch or bandage is not required after surgery.  Sensitivity to light, blurry vision, double vision, foreign body sensation (feeling like the eyes have something in them or are scratchy), aching or sore eyes especially with movement, bloodstained orange/red tears and crusting along the eyelashes are all normal after surgery. These will be the worst for the first 24-48 hours after surgery. As a result, some patients will elect to keep their eyes closed for 1-3 days after surgery. This is normal. Whenever Meño is comfortable, she may open his eyes.    Movies, tablets, and phones may be watched anytime. If glasses are worn, it is ok to keep them off while the eyes are resting and resume wear once the patient is comfortably opening the eyes again in a few days.   Avoid eye pressure, rubbing, straining, and athletics for 1 week. (Don't worry, Dr. Barajas has never seen a child pop a stitch or cause harm despite some inevitable rubbing.)   It is normal for the white part of the eyes to be red/orange/purple and puffy or gelatinous like a gummy bear on the surface of the eye. This is just a bruise and will fade away slowly over a few weeks.   To prevent infection, it is important to keep  dirty  water, sand, and dirt out of the eye after surgery. So, no swimming (lakes or pools), sand, or dirt in the eyes for 2 weeks after surgery. Bathe or shower as usual.  The  muscle ache  discomfort experienced after eye muscle surgery improves significantly over the first 2 to 3 days after surgery. Young children may receive Tylenol or ibuprofen in the usual doses if they seem uncomfortable or irritable. Cool washcloths or ice placed over the eyes can be soothing. Activity is limited only by the individual patient's level of comfort.   An antibiotic eye drop or ointment may be prescribed to use for 1 week after surgery.  Scars are nature's way of healing a surgical wound. The scars are not  "usually noticeable, unless more than one surgery is required. Techniques are used at the time of surgery to minimize scarring. Scars are located in the thin conjunctiva covering the white of the eye, and are not on the skin of the eyelid.  Meño may return to /school/work whenever comfortable. Surgery is generally on a Thursday. Most patients return on the Monday after surgery.   It takes 1-2 months for the eye muscles to fully regain their strength, for the brain to figure out the new system, and for the eye alignment to normalize. During this time, Meño may experience double vision (\"I see 2 mommies/daddies\") and some unsteadiness. After surgery, the eyes may appear to wander in any direction (in, out, up, or down). This is normal and will gradually improve each day. It is hard to wait, but trust that it will improve with time. If Meño is complaining of double vision past 3 weeks after surgery please call Dr. Barajas's clinic to discuss with her team.      Will another surgery be needed?  While every attempt is made to correct the misalignment with just one surgery, more than one surgery may be required.  This is related to the individual's healing after muscle surgery, and other types of misalignment of the eyes that may develop in the future. There is no specific number of surgeries beyond which additional surgeries cannot be performed. There is no specific age beyond which eye muscle surgery cannot be performed.     What are the risks of strabismus surgery? The most common  complication from eye muscle surgery is an under-correction or over-correction of the misalignment that requires additional surgery (on average, about 1 out of 3 patients will need another operation at some time in their life). Other very rare complications include bleeding, infection in the eye, or damage to any structure in or around the eye. These are uncommon, and most often easily treated with no long-term impact to vision. Less " "than 1% of the time, they could result in permanent loss of vision, blindness, or loss of the eye. This is considered very safe. For context, statistically, you are less safe driving on the highway for 1-2 hours. In addition, surgery may expose the patient to other rare complications such as a reaction to anesthesia (again less than 1% of the time). The anesthesiologist will review these risks prior to surgery. If adverse reactions occur, the situation will be handled in the best interest of the patient, even if surgery needs to be postponed.     Dr. Barajas's surgery scheduler, Shalonda, will contact you in the next few business days to schedule surgery. For questions, call [phone].      For a free and informative book on strabismus (eye misalignment disorders), go to: [email or web address]/eyemusclebook     For more information, see also: http://eyewiki.aao.org/Category:Pediatric_Ophthalmology/Strabismus     I recommend eye muscle surgery. Today with Meño, I reviewed the indications, risks, benefits, and alternatives of eye muscle surgery including, but not limited to, failure obtain the desired ocular alignment (\"over\" or \"under\" correction), diplopia, and damage to any structure in or around the eye that may necessitate treatment with medicine, laser, or surgery. I further explained that the goal of surgery is to help control Meño's strabismus. Surgery will not \"cure\" Meño's strabismus or resolve/prevent the need for refractive correction. Additional strabismus surgery may be required in the short or long term. I emphasized that regular follow-up to monitor and optimize her vision and alignment would be necessary. We also discussed the risks of surgical injury, bleeding, and infection which may necessitate further medical or surgical treatment and which may result in diplopia, loss of vision, blindness, or loss of the eye(s) in less than 1% of cases and the remote possibility of permanent damage to any organ " system or death with the use of general anesthesia.  I explained that we would hide visible scars as much as possible in natural creases but that every patient heals and pigments differently resulting in a variable degree of scarring to the eyes or surrounding facial structures after surgery.  I also discussed that trainees would be involved in Meño's surgery under my direct supervision.  I provided multiple opportunities for questions, answered all questions to the best of my ability, and confirmed that my answers and my discussion were understood.

## 2024-03-14 ENCOUNTER — OFFICE VISIT (OUTPATIENT)
Dept: OPHTHALMOLOGY | Facility: CLINIC | Age: 24
End: 2024-03-14
Attending: OPHTHALMOLOGY
Payer: COMMERCIAL

## 2024-03-14 DIAGNOSIS — H55.01 CONGENITAL NYSTAGMUS: ICD-10-CM

## 2024-03-14 DIAGNOSIS — H50.10 MONOCULAR EXOTROPIA: Primary | ICD-10-CM

## 2024-03-14 DIAGNOSIS — Q14.1 CONGENITAL HYPOPLASIA OF FOVEA CENTRALIS: ICD-10-CM

## 2024-03-14 DIAGNOSIS — E70.321 OCA2 (OCULOCUTANEOUS ALBINISM TYPE 2) (H): ICD-10-CM

## 2024-03-14 PROCEDURE — 92060 SENSORIMOTOR EXAMINATION: CPT

## 2024-03-14 ASSESSMENT — CONF VISUAL FIELD
OD_INFERIOR_NASAL_RESTRICTION: 0
OS_NORMAL: 1
OS_SUPERIOR_NASAL_RESTRICTION: 0
METHOD: COUNTING FINGERS
OD_SUPERIOR_TEMPORAL_RESTRICTION: 0
OD_SUPERIOR_NASAL_RESTRICTION: 0
OD_NORMAL: 1
OS_SUPERIOR_TEMPORAL_RESTRICTION: 0
OS_INFERIOR_TEMPORAL_RESTRICTION: 0
OD_INFERIOR_TEMPORAL_RESTRICTION: 0
OS_INFERIOR_NASAL_RESTRICTION: 0

## 2024-03-14 ASSESSMENT — VISUAL ACUITY
CORRECTION_TYPE: GLASSES
OD_CC+: +2
OD_CC: 20/60
OS_CC: 20/70
METHOD: SNELLEN - LINEAR
OS_CC+: -2

## 2024-03-14 ASSESSMENT — REFRACTION_WEARINGRX
SPECS_TYPE: BIFOCAL
OD_ADD: +3.50
OD_CYLINDER: +2.50
OS_SPHERE: -0.50
OS_CYLINDER: +4.00
OD_SPHERE: -0.50
OD_AXIS: 095
OS_AXIS: 080
OS_ADD: +3.50

## 2024-03-14 NOTE — NURSING NOTE
Chief Complaint(s) and History of Present Illness(es)       Exotropia Evaluation              Laterality: left eye    Comments: Eye alignment stable. VA stable.  Inf: pt

## 2024-03-14 NOTE — PATIENT INSTRUCTIONS
Wear Fresnel full time (or at least 50% of day each day) for 1 week. Return to clinic in 1 week to reassess eye alignment.   If you are unable to ignore double vision, okay to cancel appointment.

## 2024-03-14 NOTE — PROGRESS NOTES
Chief Complaint(s) & History of Present Illness  Chief Complaint(s) and History of Present Illness(es)       Exotropia Evaluation              Laterality: left eye    Comments: Eye alignment stable. VA stable.  Inf: pt                      Assessment and Plan:      Meño Loaiza is a 24 year old female who presents with:     Monocular exotropia - Left Eye  Smaller LXT on cover test compared to LV. Krimsky stable. With prism lenses in office, Meño was able to intermittently suppress the second image with 25 BI. Placed 25 BI Fresnel over left lens to wear for the next week to see if suppression become easier. Meño will return in 1 week to check if Fresnel can be increased to 30 BI.  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 Barajas.       PLAN:  Return to [place] clinic on March 21 at 2:30pm to check alignment.  Attending Physician Attestation:  I did not see Meño Loaiza at this encounter, but I was available and reviewed the history, examination, assessment, and plan as documented. I agree with the plan. - Lamar Barajas MD

## 2024-03-27 NOTE — OR NURSING
Attempted Meño twice - Left voicemail on Meño's mobile number on file that case scheduled for 3/28 is canceled.     Meño returned phone call at 0000 - stated understanding. Informed office will reach out to reschedule

## 2024-05-21 RX ORDER — OMEGA-3 FATTY ACIDS/FISH OIL 300-1000MG
400 CAPSULE ORAL EVERY 4 HOURS PRN
COMMUNITY
End: 2024-08-21

## 2024-05-22 ENCOUNTER — ANESTHESIA EVENT (OUTPATIENT)
Dept: SURGERY | Facility: CLINIC | Age: 24
End: 2024-05-22
Payer: COMMERCIAL

## 2024-05-23 ENCOUNTER — ANESTHESIA (OUTPATIENT)
Dept: SURGERY | Facility: CLINIC | Age: 24
End: 2024-05-23
Payer: COMMERCIAL

## 2024-05-23 ENCOUNTER — HOSPITAL ENCOUNTER (OUTPATIENT)
Facility: CLINIC | Age: 24
Discharge: HOME | End: 2024-05-23
Attending: OPHTHALMOLOGY | Admitting: OPHTHALMOLOGY
Payer: COMMERCIAL

## 2024-05-23 VITALS
BODY MASS INDEX: 44.67 KG/M2 | OXYGEN SATURATION: 96 % | DIASTOLIC BLOOD PRESSURE: 76 MMHG | HEIGHT: 67 IN | WEIGHT: 284.61 LBS | HEART RATE: 82 BPM | TEMPERATURE: 98.7 F | RESPIRATION RATE: 18 BRPM | SYSTOLIC BLOOD PRESSURE: 123 MMHG

## 2024-05-23 DIAGNOSIS — Z98.890 POSTOPERATIVE EYE STATE: Primary | ICD-10-CM

## 2024-05-23 PROCEDURE — 250N000009 HC RX 250: Performed by: OPHTHALMOLOGY

## 2024-05-23 PROCEDURE — 370N000017 HC ANESTHESIA TECHNICAL FEE, PER MIN: Performed by: OPHTHALMOLOGY

## 2024-05-23 PROCEDURE — 67311 REVISE EYE MUSCLE: CPT | Mod: 50 | Performed by: OPHTHALMOLOGY

## 2024-05-23 PROCEDURE — 360N000076 HC SURGERY LEVEL 3, PER MIN: Performed by: OPHTHALMOLOGY

## 2024-05-23 PROCEDURE — 250N000025 HC SEVOFLURANE, PER MIN: Performed by: OPHTHALMOLOGY

## 2024-05-23 PROCEDURE — 250N000009 HC RX 250: Performed by: STUDENT IN AN ORGANIZED HEALTH CARE EDUCATION/TRAINING PROGRAM

## 2024-05-23 PROCEDURE — 999N000141 HC STATISTIC PRE-PROCEDURE NURSING ASSESSMENT: Performed by: OPHTHALMOLOGY

## 2024-05-23 PROCEDURE — 250N000013 HC RX MED GY IP 250 OP 250 PS 637: Performed by: STUDENT IN AN ORGANIZED HEALTH CARE EDUCATION/TRAINING PROGRAM

## 2024-05-23 PROCEDURE — 67311 REVISE EYE MUSCLE: CPT | Performed by: ANESTHESIOLOGY

## 2024-05-23 PROCEDURE — 710N000010 HC RECOVERY PHASE 1, LEVEL 2, PER MIN: Performed by: OPHTHALMOLOGY

## 2024-05-23 PROCEDURE — 250N000011 HC RX IP 250 OP 636: Performed by: STUDENT IN AN ORGANIZED HEALTH CARE EDUCATION/TRAINING PROGRAM

## 2024-05-23 PROCEDURE — 710N000012 HC RECOVERY PHASE 2, PER MINUTE: Performed by: OPHTHALMOLOGY

## 2024-05-23 PROCEDURE — 272N000001 HC OR GENERAL SUPPLY STERILE: Performed by: OPHTHALMOLOGY

## 2024-05-23 PROCEDURE — 258N000003 HC RX IP 258 OP 636: Performed by: STUDENT IN AN ORGANIZED HEALTH CARE EDUCATION/TRAINING PROGRAM

## 2024-05-23 RX ORDER — SODIUM CHLORIDE, SODIUM LACTATE, POTASSIUM CHLORIDE, CALCIUM CHLORIDE 600; 310; 30; 20 MG/100ML; MG/100ML; MG/100ML; MG/100ML
INJECTION, SOLUTION INTRAVENOUS CONTINUOUS
Status: DISCONTINUED | OUTPATIENT
Start: 2024-05-23 | End: 2024-05-23 | Stop reason: HOSPADM

## 2024-05-23 RX ORDER — LABETALOL HYDROCHLORIDE 5 MG/ML
10 INJECTION, SOLUTION INTRAVENOUS
Status: DISCONTINUED | OUTPATIENT
Start: 2024-05-23 | End: 2024-05-23 | Stop reason: HOSPADM

## 2024-05-23 RX ORDER — OXYMETAZOLINE HYDROCHLORIDE 0.05 G/100ML
SPRAY NASAL PRN
Status: DISCONTINUED | OUTPATIENT
Start: 2024-05-23 | End: 2024-05-23 | Stop reason: HOSPADM

## 2024-05-23 RX ORDER — HYDROMORPHONE HYDROCHLORIDE 1 MG/ML
0.2 INJECTION, SOLUTION INTRAMUSCULAR; INTRAVENOUS; SUBCUTANEOUS EVERY 5 MIN PRN
Status: DISCONTINUED | OUTPATIENT
Start: 2024-05-23 | End: 2024-05-23 | Stop reason: HOSPADM

## 2024-05-23 RX ORDER — LIDOCAINE 40 MG/G
CREAM TOPICAL
Status: DISCONTINUED | OUTPATIENT
Start: 2024-05-23 | End: 2024-05-23 | Stop reason: HOSPADM

## 2024-05-23 RX ORDER — LIDOCAINE HYDROCHLORIDE 20 MG/ML
INJECTION, SOLUTION INFILTRATION; PERINEURAL PRN
Status: DISCONTINUED | OUTPATIENT
Start: 2024-05-23 | End: 2024-05-23

## 2024-05-23 RX ORDER — HYDROMORPHONE HYDROCHLORIDE 1 MG/ML
0.4 INJECTION, SOLUTION INTRAMUSCULAR; INTRAVENOUS; SUBCUTANEOUS EVERY 5 MIN PRN
Status: DISCONTINUED | OUTPATIENT
Start: 2024-05-23 | End: 2024-05-23 | Stop reason: HOSPADM

## 2024-05-23 RX ORDER — PROPOFOL 10 MG/ML
INJECTION, EMULSION INTRAVENOUS PRN
Status: DISCONTINUED | OUTPATIENT
Start: 2024-05-23 | End: 2024-05-23

## 2024-05-23 RX ORDER — HYDRALAZINE HYDROCHLORIDE 20 MG/ML
2.5-5 INJECTION INTRAMUSCULAR; INTRAVENOUS EVERY 10 MIN PRN
Status: DISCONTINUED | OUTPATIENT
Start: 2024-05-23 | End: 2024-05-23 | Stop reason: HOSPADM

## 2024-05-23 RX ORDER — FENTANYL CITRATE 50 UG/ML
INJECTION, SOLUTION INTRAMUSCULAR; INTRAVENOUS PRN
Status: DISCONTINUED | OUTPATIENT
Start: 2024-05-23 | End: 2024-05-23

## 2024-05-23 RX ORDER — ERYTHROMYCIN 5 MG/G
0.5 OINTMENT OPHTHALMIC 3 TIMES DAILY
Qty: 7 G | Refills: 1 | Status: SHIPPED | OUTPATIENT
Start: 2024-05-23 | End: 2024-08-21

## 2024-05-23 RX ORDER — DEXAMETHASONE SODIUM PHOSPHATE 4 MG/ML
4 INJECTION, SOLUTION INTRA-ARTICULAR; INTRALESIONAL; INTRAMUSCULAR; INTRAVENOUS; SOFT TISSUE
Status: DISCONTINUED | OUTPATIENT
Start: 2024-05-23 | End: 2024-05-23 | Stop reason: HOSPADM

## 2024-05-23 RX ORDER — ONDANSETRON 4 MG/1
4 TABLET, ORALLY DISINTEGRATING ORAL EVERY 30 MIN PRN
Status: DISCONTINUED | OUTPATIENT
Start: 2024-05-23 | End: 2024-05-23 | Stop reason: HOSPADM

## 2024-05-23 RX ORDER — DEXAMETHASONE SODIUM PHOSPHATE 4 MG/ML
INJECTION, SOLUTION INTRA-ARTICULAR; INTRALESIONAL; INTRAMUSCULAR; INTRAVENOUS; SOFT TISSUE PRN
Status: DISCONTINUED | OUTPATIENT
Start: 2024-05-23 | End: 2024-05-23

## 2024-05-23 RX ORDER — GABAPENTIN 100 MG/1
300 CAPSULE ORAL
Status: DISCONTINUED | OUTPATIENT
Start: 2024-05-23 | End: 2024-05-23 | Stop reason: HOSPADM

## 2024-05-23 RX ORDER — FENTANYL CITRATE 50 UG/ML
50 INJECTION, SOLUTION INTRAMUSCULAR; INTRAVENOUS EVERY 5 MIN PRN
Status: DISCONTINUED | OUTPATIENT
Start: 2024-05-23 | End: 2024-05-23 | Stop reason: HOSPADM

## 2024-05-23 RX ORDER — SODIUM CHLORIDE, SODIUM LACTATE, POTASSIUM CHLORIDE, CALCIUM CHLORIDE 600; 310; 30; 20 MG/100ML; MG/100ML; MG/100ML; MG/100ML
INJECTION, SOLUTION INTRAVENOUS CONTINUOUS PRN
Status: DISCONTINUED | OUTPATIENT
Start: 2024-05-23 | End: 2024-05-23

## 2024-05-23 RX ORDER — BALANCED SALT SOLUTION 6.4; .75; .48; .3; 3.9; 1.7 MG/ML; MG/ML; MG/ML; MG/ML; MG/ML; MG/ML
SOLUTION OPHTHALMIC PRN
Status: DISCONTINUED | OUTPATIENT
Start: 2024-05-23 | End: 2024-05-23 | Stop reason: HOSPADM

## 2024-05-23 RX ORDER — NALOXONE HYDROCHLORIDE 0.4 MG/ML
0.1 INJECTION, SOLUTION INTRAMUSCULAR; INTRAVENOUS; SUBCUTANEOUS
Status: DISCONTINUED | OUTPATIENT
Start: 2024-05-23 | End: 2024-05-23 | Stop reason: HOSPADM

## 2024-05-23 RX ORDER — KETOROLAC TROMETHAMINE 30 MG/ML
INJECTION, SOLUTION INTRAMUSCULAR; INTRAVENOUS PRN
Status: DISCONTINUED | OUTPATIENT
Start: 2024-05-23 | End: 2024-05-23

## 2024-05-23 RX ORDER — ACETAMINOPHEN 325 MG/1
975 TABLET ORAL ONCE
Status: COMPLETED | OUTPATIENT
Start: 2024-05-23 | End: 2024-05-23

## 2024-05-23 RX ORDER — ONDANSETRON 2 MG/ML
4 INJECTION INTRAMUSCULAR; INTRAVENOUS EVERY 30 MIN PRN
Status: DISCONTINUED | OUTPATIENT
Start: 2024-05-23 | End: 2024-05-23 | Stop reason: HOSPADM

## 2024-05-23 RX ORDER — ONDANSETRON 2 MG/ML
INJECTION INTRAMUSCULAR; INTRAVENOUS PRN
Status: DISCONTINUED | OUTPATIENT
Start: 2024-05-23 | End: 2024-05-23

## 2024-05-23 RX ORDER — FENTANYL CITRATE 50 UG/ML
25 INJECTION, SOLUTION INTRAMUSCULAR; INTRAVENOUS EVERY 5 MIN PRN
Status: DISCONTINUED | OUTPATIENT
Start: 2024-05-23 | End: 2024-05-23 | Stop reason: HOSPADM

## 2024-05-23 RX ADMIN — KETOROLAC TROMETHAMINE 15 MG: 30 INJECTION, SOLUTION INTRAMUSCULAR at 14:47

## 2024-05-23 RX ADMIN — ONDANSETRON 4 MG: 2 INJECTION INTRAMUSCULAR; INTRAVENOUS at 14:42

## 2024-05-23 RX ADMIN — FENTANYL CITRATE 100 MCG: 50 INJECTION INTRAMUSCULAR; INTRAVENOUS at 14:08

## 2024-05-23 RX ADMIN — DEXAMETHASONE SODIUM PHOSPHATE 8 MG: 4 INJECTION, SOLUTION INTRA-ARTICULAR; INTRALESIONAL; INTRAMUSCULAR; INTRAVENOUS; SOFT TISSUE at 14:20

## 2024-05-23 RX ADMIN — SODIUM CHLORIDE, POTASSIUM CHLORIDE, SODIUM LACTATE AND CALCIUM CHLORIDE: 600; 310; 30; 20 INJECTION, SOLUTION INTRAVENOUS at 14:06

## 2024-05-23 RX ADMIN — LIDOCAINE HYDROCHLORIDE 100 MG: 20 INJECTION, SOLUTION INFILTRATION; PERINEURAL at 14:08

## 2024-05-23 RX ADMIN — DEXMEDETOMIDINE HYDROCHLORIDE 16 MCG: 100 INJECTION, SOLUTION INTRAVENOUS at 14:07

## 2024-05-23 RX ADMIN — MIDAZOLAM 2 MG: 1 INJECTION INTRAMUSCULAR; INTRAVENOUS at 13:58

## 2024-05-23 RX ADMIN — SUCCINYLCHOLINE CHLORIDE 120 MG: 20 INJECTION, SOLUTION INTRAMUSCULAR; INTRAVENOUS; PARENTERAL at 14:09

## 2024-05-23 RX ADMIN — ACETAMINOPHEN 975 MG: 325 TABLET, FILM COATED ORAL at 13:12

## 2024-05-23 RX ADMIN — PROPOFOL 200 MG: 10 INJECTION, EMULSION INTRAVENOUS at 14:08

## 2024-05-23 ASSESSMENT — ACTIVITIES OF DAILY LIVING (ADL)
ADLS_ACUITY_SCORE: 31

## 2024-05-23 NOTE — ANESTHESIA PROCEDURE NOTES
Airway       Patient location during procedure: OR       Procedure Start/Stop Times: 5/23/2024 2:13 PM  Staff -        Resident/Fellow: Jerry Harmon MD       Performed By: resident and with residents       Procedure performed by resident/fellow/CRNA in presence of a teaching physician.    Consent for Airway        Urgency: elective  Indications and Patient Condition       Indications for airway management: brenden-procedural       Induction type:intravenous       Mask difficulty assessment: 1 - vent by mask    Final Airway Details       Final airway type: endotracheal airway       Successful airway: ETT - single  Endotracheal Airway Details        ETT size (mm): 7.0       Cuffed: yes       Successful intubation technique: direct laryngoscopy       DL Blade Type: MAC 4       Grade View of Cords: 2       Adjucts: stylet       Position: Right       Measured from: lips       Secured at (cm): 22       Bite block used: Soft    Post intubation assessment        Placement verified by: capnometry, equal breath sounds and chest rise        Number of attempts at approach: 1       Number of other approaches attempted: 0       Secured with: tape       Ease of procedure: easy       Dentition: Intact and Unchanged    Medication(s) Administered   Medication Administration Time: 5/23/2024 2:13 PM

## 2024-05-23 NOTE — ANESTHESIA POSTPROCEDURE EVALUATION
Patient: Meño Loaiza    Procedure: Procedure(s):  Bilateral strabismus surgery       Anesthesia Type:  General    Note:  Disposition: Outpatient   Postop Pain Control: Uneventful            Sign Out: Well controlled pain   PONV: No   Neuro/Psych: Uneventful            Sign Out: Acceptable/Baseline neuro status   Airway/Respiratory: Uneventful            Sign Out: Acceptable/Baseline resp. status   CV/Hemodynamics: Uneventful            Sign Out: Acceptable CV status; No obvious hypovolemia; No obvious fluid overload   Other NRE:    DID A NON-ROUTINE EVENT OCCUR?            Last vitals:  Vitals Value Taken Time   /72 05/23/24 1533   Temp 37.2  C (99  F) 05/23/24 1512   Pulse 87 05/23/24 1543   Resp 15 05/23/24 1543   SpO2 95 % 05/23/24 1543   Vitals shown include unfiled device data.    Electronically Signed By: Jose Lemos MD  May 23, 2024  3:45 PM

## 2024-05-23 NOTE — DISCHARGE INSTRUCTIONS
Instructions for after your eye surgery:  Instill a small amount of erythromycin ophthalmic ointment 3-4 times a day as needed for 1-2 weeks for comfort.    Apply ice packs to eyes on and off as tolerated for 2 days.    Acetaminophen (Tylenol) and NSAIDs (Motrin, Ibuprofen, Advil, Naproxen) may be given per the dosing instructions on the label for pain every 6 hours.  I recommend alternating these two types of medicine every 3 hours so that Meño receives one of them for pain control every 3 hours.  (For example: acetaminophen - wait 3 hours - ibuprofen - wait 3 hours - acetaminophen - wait 3 hours - ibuprofen - etc.)    Avoid all eye pressure or trauma. No eye rubbing, straining, or athletics for 1 week. Avoid erika and dirty environments for 2 weeks.    No water in the face (including bathing) for 1 week. Instill your antibiotic eye drops after bathing for the first week. No swimming for 2 weeks.      Return for follow-up with Dr. Barajas as scheduled.  If you do not have an appointment already, please call to arrange follow-up.  [place]: Shalonda Perrin at [phone] or our  at [phone]      If Meño Loaiza experiences worsening RSVP (Redness, Sensitivity to light, Vision, Pain), or if Meño develops a fever (temperature greater than 100.4 F) or worsening discharge or if you have any other concerns:    call Dr. Barajas's cell phone: [phone]  OR  call [phone] (during business hours) or [phone] (after hours & weekends) and ask to speak with the Ophthalmology Resident or Fellow On-Call   OR  return to the eye clinic or emergency room immediately.     If Meño is unable to tolerate food and drink, vomits 3 times, or appears to have decreased alertness or lethargy, return to the emergency room immediately as these can be signs of delayed stomach wake-up after anesthesia and Meño may need IV fluids to prevent dehydration.    For assistance from an :  7 AM - 6 PM on  Monday - Friday, and 7 AM - 4:30 PM on Saturday & Sunday: call [phone], then select option 3.  After hours: call [phone] and ask the  for  assistance.      After Anesthesia (Sleep Medicine)  What should I do after anesthesia?  You should rest and relax for the next 24 hours. Avoid risky or difficult (strenuous) activity. A responsible adult should stay with you overnight.  Don't drive or use any heavy equipment for 24 hours. Even if you feel normal, your reactions may be affected by the sleep medicine given to you.  Don't drink alcohol or make any important decisions for 24 hours.  Slowly get back to your regular diet, as you feel able.  How should I expect to feel?  It's normal to feel dizzy, light-headed, or faint for up to a full day after anesthesia or while taking pain medicine. If this happens:   Sit down for a few minutes before standing.  Have someone help you when you get up to walk or use the bathroom.  If you have nausea (feel sick to your stomach) or vomit (throw up):   Drink clear liquids (such as apple juice, ginger ale, broth, or 7UP) until you feel better.  If you feel sick to your stomach, or you keep vomiting for 24 hours, please call the doctor.  Pain    Please refer to your doctor's (or  bottle) recommendations for dosing per weight and frequency  of Tylenol (acetaminophen) and ibuprofen. Your child's weight today was Weight: 129.1 kg (284 lb 9.8 oz)    Last dose of Tylenol given at 1:12 PM. Next dose due at 7:12 PM.  Last dose of NSAID (toradol or ibuprofen) given at 2:45 PM. Next dose due at  10 PM.    Continue to alternate Tylenol and ibuprofen every 3-4 hours, scheduled for 24-48 hours, then as needed for comfort.     What else should I know?  You might have a dry mouth, sore throat, muscle aches, or trouble sleeping. These should go away after 24 hours.  Please contact your doctor if you have any other symptoms that concern you, such as fever, pain,  bleeding, fluid drainage, swelling, or headache, or if it's been over 8 to 10 hours and you still aren't able to pee (urinate).  If you have a history of sleep apnea, it's very important to use your CPAP machine for the next 24 hours when you nap or sleep.   For informational purposes only. Not to replace the advice of your health care provider. Copyright   2023 [facility]. All rights reserved. Clinically reviewed by Deyvi Butts MD. [employer] [id number] - REV 09/23.    To contact a doctor, call Dr. Barajas, Ophthalmology at [phone]  or:     [phone] and ask for the Resident On Call for:          Ophthalmology (answered 24 hours a day)   Emergency Departments:  [facility] Adult Emergency Department: [phone]     [facility] Children's Emergency Department: [phone]

## 2024-05-23 NOTE — ANESTHESIA CARE TRANSFER NOTE
Patient: Meño Loaiza    Procedure: Procedure(s):  Bilateral strabismus surgery       Diagnosis: Monocular exotropia [H50.10]  Diagnosis Additional Information: No value filed.    Anesthesia Type:   General     Note:    Oropharynx: oropharynx clear of all foreign objects and spontaneously breathing  Level of Consciousness: awake  Oxygen Supplementation: face mask  Level of Supplemental Oxygen (L/min / FiO2): 6  Independent Airway: airway patency satisfactory and stable  Dentition: dentition unchanged  Vital Signs Stable: post-procedure vital signs reviewed and stable  Report to RN Given: handoff report given  Patient transferred to: PACU    Handoff Report: Identifed the Patient, Identified the Reponsible Provider, Reviewed the pertinent medical history, Discussed the surgical course, Reviewed Intra-OP anesthesia mangement and issues during anesthesia, Set expectations for post-procedure period and Allowed opportunity for questions and acknowledgement of understanding      Vitals:  Vitals Value Taken Time   /80 05/23/24 1512   Temp     Pulse 101 05/23/24 1515   Resp 21 05/23/24 1515   SpO2 94 % 05/23/24 1515   Vitals shown include unfiled device data.    Electronically Signed By: Jerry Harmon MD  May 23, 2024  3:16 PM

## 2024-05-23 NOTE — OP NOTE
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    DETAILS OF THE PROCEDURE:       On the day of surgery, I, Lamar Barajas MD, met the patient, Meño Loaiza, in the preoperative holding area with her family.  I identified the patient and operative sites and marked them on the preoperative marking sheet.  The indications, risks, benefits, and alternatives for the planned procedure were again discussed with the patient and family.  I answered their questions, and they agreed to proceed.  The patient was then transported to the operating room where she was placed under general anesthesia by the anesthesiologist.  The bed was turned 90 degrees.  The patient was prepped and draped in the usual sterile fashion.  I participated in a preoperative briefing and time-out and personally identified the patient, surgical plan, and operative site(s).   A speculum was placed before the right eye and forced ductions were performed and showed no restriction. The speculum was removed and then placed in the left eye where forced ductions were performed and showed no  restrictions. All instruments were removed from the eyes.   Attention was directed to the right eye where a lid speculum was placed.  The limbal conjunctiva and episclera were grasped with Obregon locking forceps in the inferotemporal quadrant and the globe was rotated superonasally.  A cul-de-sac incision in the conjunctiva was made five millimeters posterior to limbus with Ml scissors.  The dissection was carried through Tenon's capsule and episclera down to bare sclera.  A small muscle hook was then used to isolate the lateral rectus muscle followed by a large muscle hook.  Using the small hook, the conjunctiva and Tenon's capsule were then retracted around the tip of the large muscle hook to cleanly reveal its tip. Pole testing confirmed that the entire muscle had been isolated. A cotton-tipped applicator, small hook, and Ml scissors were used to further dissect through Tenon's capsule anterior to the muscle insertion to expose it cleanly.  A double-armed 6-0 Vicryl suture was then imbricated into the muscle just posterior to its insertion and a locking bite was placed in both the superior and inferior one-fourth of the muscle.  The muscle was then cut from its insertion with Ml scissors.  Castroviejo calipers were used to measure and elizabeth 7.5 millimeters posterior to the muscle's original insertion.  Each arm of the 6-0 Vicryl suture attached to the muscle was then sutured to this new position using partial-thickness scleral passes in a crossed-swords fashion with tick-bite technique for the superior pole.  The tip of each needle was visualized throughout its pass through the sclera to ensure appropriate depth.   One drop of Betadine 5% ophthalmic solution was instilled into the surgical wound.  The muscle was then pulled up firmly against the globe. Accurate placement was verified with calipers.  The muscle was tied securely in place in a 3-1-1 fashion.  The sutures were then cut leaving a 2 mm  tail beyond the knot and the needles and excess suture were removed from the field. The conjunctival incision was then closed with 8-0 vicryl suture in an interrupted fashion and tied in a 2-1 fashion.  The sutures were then cut leaving a 1 mm tail beyond the knot and the needles and excess suture were removed from the field.  Another drop of betadine was instilled onto the eye.  The lid speculum was removed from the eye. The right eye was taped shut.   Attention was directed to the left eye where a lid speculum was placed.  The limbal conjunctiva and episclera were grasped with Obregon locking forceps in the inferotemporal quadrant and the globe was rotated superonasally.  A cul-de-sac incision in the conjunctiva was made five millimeters posterior to limbus with Ml scissors.  The dissection was carried through Tenon's capsule and episclera down to bare sclera.  A small muscle hook was then used to isolate the lateral rectus muscle followed by a large muscle hook.  Using the small hook, the conjunctiva and Tenon's capsule were then retracted around the tip of the large muscle hook to cleanly reveal its tip. Pole testing confirmed that the entire muscle had been isolated. A cotton-tipped applicator, small hook, and Ml scissors were used to further dissect through Tenon's capsule anterior to the muscle insertion to expose it cleanly.  A double-armed 6-0 Vicryl suture was then imbricated into the muscle just posterior to its insertion and a locking bite was placed in both the superior and inferior one-fourth of the muscle.  The muscle was then cut from its insertion with Ml scissors.  Castroviejo calipers were used to measure and elizabeth 7.5 millimeters posterior to the muscle's original insertion.  Each arm of the 6-0 Vicryl suture attached to the muscle was then sutured to this new position using partial-thickness scleral passes in a crossed-swords fashion.  The tip of each needle was visualized  throughout its pass through the sclera to ensure appropriate depth.   One drop of Betadine 5% ophthalmic solution was instilled into the surgical wound.  The muscle was then pulled up firmly against the globe. Accurate placement was verified with calipers.  The muscle was tied securely in place in a 3-1-1 fashion.  The sutures were then cut leaving a 2 mm tail beyond the knot and the needles and excess suture were removed from the field. The conjunctival incision was then closed with 8-0 vicryl suture in an interrupted fashion and tied in a 2-1 fashion.  The sutures were then cut leaving a 1 mm tail beyond the knot and the needles and excess suture were removed from the field.  Another drop of betadine was instilled onto the eye.  The lid speculum was removed from the eye.     The drapes were removed, the periocular skin was cleaned with sterile saline, and lidocaine ophthalmic ointment was instilled in both eyes. The head of the bed was turned back to the anesthesiologist for reversal of anesthesia.  There were no complications.  Dr. Barajas was present for the entire procedure.    Lamar Barajas MD    Pediatric Ophthalmology & Strabismus  Department of Ophthalmology & Visual Neurosciences  [facility]

## 2024-05-23 NOTE — ANESTHESIA PREPROCEDURE EVALUATION
Anesthesia Pre-Procedure Evaluation    Patient: Meño Loaiza   MRN: [id number] : 2000        Procedure : Procedure(s):  Bilateral strabismus surgery          Past Medical History:   Diagnosis Date    Nystagmus     OCA2 (oculocutaneous albinism type 2) (H24)     Strabismus       Past Surgical History:   Procedure Laterality Date    TONSILLECTOMY & ADENOIDECTOMY        Allergies   Allergen Reactions    Amoxicillin-Pot Clavulanate Hives      Social History     Tobacco Use    Smoking status: Never    Smokeless tobacco: Never   Substance Use Topics    Alcohol use: Yes     Comment: occ.      Wt Readings from Last 1 Encounters:   24 129.1 kg (284 lb 9.8 oz)        HPI:  Meño Loaiza is a 24 year old female with a primary diagnosis of strabismus who presents for strabismus repair.    Review of anesthesia relevant diagnoses:  - (FH of) Malignant Hyperthermia: No  - Challenges in airway management: No  - (FH of) PONV: No  - Other: No      Anesthesia Evaluation   Pt has had prior anesthetic. Type: General.    No history of anesthetic complications       ROS/MED HX  ENT/Pulmonary: Comment: Monocular exotropia    Monocular exotropia - Left Eye  Congenital nystagmus - Both Eyes  Iris transillumination of both eyes  Low vision, both eyes  Hypermetropia, bilateral  Regular astigmatism, bilateral  Hypermetropia of right eye      (+)     NATHANIEL risk factors,   obese,                             (-) sleep apnea   Neurologic:  - neg neurologic ROS     Cardiovascular:  - neg cardiovascular ROS  (-) murmur   METS/Exercise Tolerance: >4 METS    Hematologic:  - neg hematologic  ROS     Musculoskeletal: Comment: Ocular torticollis      GI/Hepatic:  - neg GI/hepatic ROS     Renal/Genitourinary:  - neg Renal ROS     Endo: Comment:   - OCA2 (oculocutaneous albinism type 2)     (+)               Obesity (morbid),       Psychiatric/Substance Use:  - neg psychiatric ROS     Infectious Disease:  - neg infectious disease ROS    "  Malignancy:  - neg malignancy ROS     Other:            Physical Exam    Airway        Mallampati: I   TM distance: > 3 FB   Neck ROM: full   Mouth opening: > 3 cm    Respiratory Devices and Support         Dental  no notable dental history     (+) Minor Abnormalities - some fillings, tiny chips      Cardiovascular          Rhythm and rate: regular and normal (-) no murmur    Pulmonary           breath sounds clear to auscultation           OUTSIDE LABS:  CBC:   Lab Results   Component Value Date    WBC 9.9 07/24/2008    HGB 11.4 07/24/2008    HCT 33.8 07/24/2008     07/24/2008     BMP: No results found for: \"NA\", \"POTASSIUM\", \"CHLORIDE\", \"CO2\", \"BUN\", \"CR\", \"GLC\"  COAGS: No results found for: \"PTT\", \"INR\", \"FIBR\"  POC: No results found for: \"BGM\", \"HCG\", \"HCGS\"  HEPATIC: No results found for: \"ALBUMIN\", \"PROTTOTAL\", \"ALT\", \"AST\", \"GGT\", \"ALKPHOS\", \"BILITOTAL\", \"BILIDIRECT\", \"WELLINGTON\"  OTHER:   Lab Results   Component Value Date    SED 9 07/24/2008       Anesthesia Plan    ASA Status:  3    NPO Status:  NPO Appropriate    Anesthesia Type: General.     - Airway: ETT   Induction: Intravenous.   Maintenance: Balanced.        Consents    Anesthesia Plan(s) and associated risks, benefits, and realistic alternatives discussed. Questions answered and patient/representative(s) expressed understanding.     - Discussed:     - Discussed with:  Parent (Mother and/or Father)      - Extended Intubation/Ventilatory Support Discussed: No.      - Patient is DNR/DNI Status: No     Use of blood products discussed: No .     Postoperative Care    Pain management: IV analgesics, Oral pain medications, Multi-modal analgesia.   PONV prophylaxis: Ondansetron (or other 5HT-3), Dexamethasone or Solumedrol, Background Propofol Infusion     Comments:    Other Comments: Anxiolytic/Sedating meds prior to procedure:  N/A    Discussed common and potentially harmful risks for General Anesthesia.   These risks include, but were not limited to: " "Conversion to secured airway, Sore throat, Airway injury, Dental injury, Aspiration, Respiratory issues (Bronchospasm, Laryngospasm, Desaturation), Hemodynamic issues (Arrhythmia, Hypotension, Ischemia), Potential long term consequences of respiratory and hemodynamic issues, PONV, Emergence delirium/agitation  Risks of invasive procedures were not discussed: N/A    All questions were answered.           Richmond Ha MD    I have reviewed the pertinent notes and labs in the chart from the past 30 days and (re)examined the patient.  Any updates or changes from those notes are reflected in this note.              # Severe Obesity: Estimated body mass index is 44.58 kg/m  as calculated from the following:    Height as of this encounter: 1.702 m (5' 7\").    Weight as of this encounter: 129.1 kg (284 lb 9.8 oz).      "

## 2024-05-30 ENCOUNTER — TELEPHONE (OUTPATIENT)
Dept: OPHTHALMOLOGY | Facility: CLINIC | Age: 24
End: 2024-05-30
Payer: COMMERCIAL

## 2024-05-30 NOTE — TELEPHONE ENCOUNTER
Meño Loaiza is a 24 year old female who is being evaluated via telephone on May 30, 2024.    The parent/guardian of Meño Loaiza was called today at the request of Dr. Barajas (Ordering Provider) for post-operative evaluation.    Meño Loaiza underwent bilateral Strabismus repair on 05/23/2024.    Patient assessement:   Is the patient comfortable? Yes   Is the patient afebrile? Yes   Have you discontinued ointment? Yes   Did the surgery day go well? Yes  Is the eye redness decreasing? Yes   Are the eyes free of swelling? Yes   Do you have any concerns today that you would like reviewed with the provider? No    Plan of care: Follow up as planned 08/2024    Daja Rodriguez CO

## 2024-05-30 NOTE — TELEPHONE ENCOUNTER
----- Message from Darian Perrin sent at 3/28/2024 10:21 AM CDT -----  Regarding: Strul - Surgery 5/23  3/28 surgery rescheduled to 5/23 (provider ill)

## 2024-08-21 ENCOUNTER — OFFICE VISIT (OUTPATIENT)
Dept: OPHTHALMOLOGY | Facility: CLINIC | Age: 24
End: 2024-08-21
Attending: OPHTHALMOLOGY
Payer: COMMERCIAL

## 2024-08-21 DIAGNOSIS — H54.52A1: ICD-10-CM

## 2024-08-21 DIAGNOSIS — H55.01 CONGENITAL NYSTAGMUS: ICD-10-CM

## 2024-08-21 DIAGNOSIS — Q14.1 CONGENITAL HYPOPLASIA OF FOVEA CENTRALIS: ICD-10-CM

## 2024-08-21 DIAGNOSIS — H50.10 MONOCULAR EXOTROPIA: Primary | ICD-10-CM

## 2024-08-21 DIAGNOSIS — H52.223 REGULAR ASTIGMATISM OF BOTH EYES: ICD-10-CM

## 2024-08-21 DIAGNOSIS — E70.321 OCA2 (OCULOCUTANEOUS ALBINISM TYPE 2) (H): ICD-10-CM

## 2024-08-21 DIAGNOSIS — H21.233 IRIS TRANSILLUMINATION OF BOTH EYES: ICD-10-CM

## 2024-08-21 PROCEDURE — 99024 POSTOP FOLLOW-UP VISIT: CPT | Performed by: OPHTHALMOLOGY

## 2024-08-21 PROCEDURE — 92060 SENSORIMOTOR EXAMINATION: CPT | Performed by: OPHTHALMOLOGY

## 2024-08-21 PROCEDURE — 99213 OFFICE O/P EST LOW 20 MIN: CPT | Performed by: OPHTHALMOLOGY

## 2024-08-21 ASSESSMENT — VISUAL ACUITY
OD_CC: 20/60
METHOD: SNELLEN - LINEAR FOGGED
OS_CC+: -2
CORRECTION_TYPE: GLASSES
OS_CC: 20/70

## 2024-08-21 ASSESSMENT — REFRACTION_WEARINGRX
SPECS_TYPE: BIFOCAL
OD_AXIS: 095
OS_ADD: +3.50
OS_CYLINDER: +4.00
OS_SPHERE: -0.50
OD_SPHERE: -0.50
OS_AXIS: 080
OD_ADD: +3.50
OD_CYLINDER: +2.50

## 2024-08-21 ASSESSMENT — EXTERNAL EXAM - RIGHT EYE: OD_EXAM: BLOND HAIR

## 2024-08-21 ASSESSMENT — SLIT LAMP EXAM - LIDS
COMMENTS: BLOND LASHES
COMMENTS: BLOND LASHES

## 2024-08-21 NOTE — LETTER
8/21/2024       RE: Meño Loaiza  [address]  [place] [address]     Dear Colleague,    Thank you for referring your patient, Meño Loaiza, to the [facility] [place] at [facility]. Please see a copy of my visit note below.    Chief Complaint(s) and History of Present Illness(es)       Exotropia Evaluation    In left eye.  Disease is present since childhood. Additional comments: Eyes healed well, no diplopia, no concerns with alignment, no new concerns  Inf pt                Review of systems for the eyes was negative other than the pertinent positives and negatives noted in the HPI.    History is obtained from the patient.     Primary care: Braden Ahn   [place] is home  Assessment & Plan   Meño Loaiza is a 24 year old female who presents with:     Monocular exotropia  OCA2 (oculocutaneous albinism type 2) (H24)  Low vision, both eyes  Iris transillumination of both eyes    Status-post bilateral lateral rectus recession 7.5mm 5/23/24        Return for 1-2 years Dr. Berg or Dr. Barajas in [place].    There are no Patient Instructions on file for this visit.    Visit Diagnoses & Orders    ICD-10-CM    1. Monocular exotropia  H50.10 Sensorimotor      2. OCA2 (oculocutaneous albinism type 2) (H24)  E70.321       3. Congenital nystagmus - Both Eyes  H55.01       4. 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 Iris transillumination of both eyes  H21.233       6. Category 1 low vision of left eye with normal vision of right eye  H54.52A1       7. Regular astigmatism of both eyes  H52.223          Attending Physician Attestation:  Complete documentation of historical and exam elements from today's encounter can be found in the full encounter summary report (not reduplicated in this progress note).  I personally obtained the chief complaint(s) and history of present illness.  I  confirmed and edited as necessary the review of systems, past medical/surgical history, family history, social history, and examination findings as documented by others; and I examined the patient myself.  I personally reviewed the relevant tests, images, and reports as documented above.  I formulated and edited as necessary the assessment and plan and discussed the findings and management plan with the patient and family. - Lamar Barajas MD                Again, thank you for allowing me to participate in the care of your patient.      Sincerely,    Lamar Barajas MD

## 2024-08-21 NOTE — PROGRESS NOTES
Chief Complaint(s) and History of Present Illness(es)       Exotropia Evaluation    In left eye.  Disease is present since childhood. Additional comments: Eyes healed well, no diplopia, no concerns with alignment, no new concerns  Inf pt                Review of systems for the eyes was negative other than the pertinent positives and negatives noted in the HPI.    History is obtained from the patient.     Primary care: Braden Ahn   [facility] is home  Assessment & Plan   Meño Loaiza is a 24 year old female who presents with:     Monocular exotropia  OCA2 (oculocutaneous albinism type 2) (H24)  Low vision, both eyes  Iris transillumination of both eyes    Status-post bilateral lateral rectus recession 7.5mm 5/23/24        Return for 1-2 years Dr. Berg or Dr. Barajas in [place].    There are no Patient Instructions on file for this visit.    Visit Diagnoses & Orders    ICD-10-CM    1. Monocular exotropia  H50.10 Sensorimotor      2. OCA2 (oculocutaneous albinism type 2) (H24)  E70.321       3. Congenital nystagmus - Both Eyes  H55.01       4. 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 Iris transillumination of both eyes  H21.233       6. Category 1 low vision of left eye with normal vision of right eye  H54.52A1       7. Regular astigmatism of both eyes  H52.223          Attending Physician Attestation:  Complete documentation of historical and exam elements from today's encounter can be found in the full encounter summary report (not reduplicated in this progress note).  I personally obtained the chief complaint(s) and history of present illness.  I confirmed and edited as necessary the review of systems, past medical/surgical history, family history, social history, and examination findings as documented by others; and I examined the patient myself.  I personally reviewed the relevant tests, images, and reports as documented above.  I formulated and edited as necessary the  assessment and plan and discussed the findings and management plan with the patient and family. - Lamar Barajas MD

## 2024-08-21 NOTE — PATIENT INSTRUCTIONS
Healed beautifully! Continue full time glasses wear (100% of waking hours) and plan for follow up in 1-2 years. Call if needing a referral.

## 2024-08-21 NOTE — NURSING NOTE
Chief Complaint(s) and History of Present Illness(es)       Exotropia Evaluation              Laterality: left eye    Onset: present since childhood    Comments: Eyes healed well, no diplopia, no concerns with alignment, no new concerns  Inf pt

## 2024-10-18 ENCOUNTER — LAB REQUISITION (OUTPATIENT)
Dept: LAB | Facility: CLINIC | Age: 24
End: 2024-10-18
Payer: COMMERCIAL

## 2024-10-18 DIAGNOSIS — Z11.3 ENCOUNTER FOR SCREENING FOR INFECTIONS WITH A PREDOMINANTLY SEXUAL MODE OF TRANSMISSION: ICD-10-CM

## 2024-10-18 DIAGNOSIS — Z12.4 ENCOUNTER FOR SCREENING FOR MALIGNANT NEOPLASM OF CERVIX: ICD-10-CM

## 2024-10-18 DIAGNOSIS — L68.0 HIRSUTISM: ICD-10-CM

## 2024-10-18 LAB — PROGEST SERPL-MCNC: 0.5 NG/ML

## 2024-10-18 PROCEDURE — 87491 CHLMYD TRACH DNA AMP PROBE: CPT | Mod: ORL | Performed by: OBSTETRICS & GYNECOLOGY

## 2024-10-18 PROCEDURE — 82627 DEHYDROEPIANDROSTERONE: CPT | Mod: ORL | Performed by: OBSTETRICS & GYNECOLOGY

## 2024-10-18 PROCEDURE — G0145 SCR C/V CYTO,THINLAYER,RESCR: HCPCS | Mod: ORL | Performed by: OBSTETRICS & GYNECOLOGY

## 2024-10-18 PROCEDURE — 82157 ASSAY OF ANDROSTENEDIONE: CPT | Mod: ORL | Performed by: OBSTETRICS & GYNECOLOGY

## 2024-10-18 PROCEDURE — 84403 ASSAY OF TOTAL TESTOSTERONE: CPT | Mod: ORL | Performed by: OBSTETRICS & GYNECOLOGY

## 2024-10-18 PROCEDURE — 84144 ASSAY OF PROGESTERONE: CPT | Mod: ORL | Performed by: OBSTETRICS & GYNECOLOGY

## 2024-10-19 LAB
C TRACH DNA SPEC QL PROBE+SIG AMP: NEGATIVE
N GONORRHOEA DNA SPEC QL NAA+PROBE: NEGATIVE

## 2024-10-21 LAB — DHEA-S SERPL-MCNC: 681 UG/DL (ref 35–430)

## 2024-10-22 LAB — TESTOST SERPL-MCNC: 37 NG/DL (ref 8–60)

## 2024-10-23 LAB
ANDROST SERPL-MCNC: 1.72 NG/ML
BKR LAB AP GYN ADEQUACY: NORMAL
BKR LAB AP GYN INTERPRETATION: NORMAL
BKR LAB AP HPV REFLEX: NORMAL
BKR LAB AP LMP: NORMAL
BKR LAB AP PREVIOUS ABNL DX: NORMAL
BKR LAB AP PREVIOUS ABNORMAL: NORMAL
PATH REPORT.COMMENTS IMP SPEC: NORMAL
PATH REPORT.COMMENTS IMP SPEC: NORMAL
PATH REPORT.RELEVANT HX SPEC: NORMAL

## 2024-10-31 ENCOUNTER — HOSPITAL ENCOUNTER (OUTPATIENT)
Facility: CLINIC | Age: 24
Discharge: HOME | End: 2024-10-31
Admitting: OBSTETRICS & GYNECOLOGY
Payer: COMMERCIAL

## 2024-10-31 ENCOUNTER — LAB REQUISITION (OUTPATIENT)
Dept: LAB | Facility: CLINIC | Age: 24
End: 2024-10-31

## 2024-10-31 DIAGNOSIS — L68.0 HIRSUTISM: ICD-10-CM

## 2024-10-31 DIAGNOSIS — E66.9 OBESITY, UNSPECIFIED: ICD-10-CM

## 2024-10-31 LAB
EST. AVERAGE GLUCOSE BLD GHB EST-MCNC: 105 MG/DL
HBA1C MFR BLD: 5.3 %
HOLD SPECIMEN: NORMAL

## 2024-10-31 PROCEDURE — 83001 ASSAY OF GONADOTROPIN (FSH): CPT | Performed by: OBSTETRICS & GYNECOLOGY

## 2024-10-31 PROCEDURE — 84146 ASSAY OF PROLACTIN: CPT | Performed by: OBSTETRICS & GYNECOLOGY

## 2024-10-31 PROCEDURE — 83036 HEMOGLOBIN GLYCOSYLATED A1C: CPT | Performed by: OBSTETRICS & GYNECOLOGY

## 2024-10-31 PROCEDURE — 83498 ASY HYDROXYPROGESTERONE 17-D: CPT | Performed by: OBSTETRICS & GYNECOLOGY

## 2024-10-31 PROCEDURE — 82670 ASSAY OF TOTAL ESTRADIOL: CPT | Performed by: OBSTETRICS & GYNECOLOGY

## 2024-10-31 PROCEDURE — 80061 LIPID PANEL: CPT | Performed by: OBSTETRICS & GYNECOLOGY

## 2024-11-01 LAB
CHOLEST SERPL-MCNC: 130 MG/DL
ESTRADIOL SERPL-MCNC: 44 PG/ML
FASTING STATUS PATIENT QL REPORTED: NO
FSH SERPL IRP2-ACNC: 5.2 MIU/ML
HDLC SERPL-MCNC: 48 MG/DL
LDLC SERPL CALC-MCNC: 63 MG/DL
NONHDLC SERPL-MCNC: 82 MG/DL
PROLACTIN SERPL 3RD IS-MCNC: 24 NG/ML (ref 5–23)
TRIGL SERPL-MCNC: 95 MG/DL

## 2024-11-07 LAB — 17OHP SERPL-MCNC: 22 NG/DL

## 2024-11-13 ENCOUNTER — ANCILLARY PROCEDURE (OUTPATIENT)
Dept: CT IMAGING | Facility: CLINIC | Age: 24
End: 2024-11-13
Attending: OBSTETRICS & GYNECOLOGY
Payer: COMMERCIAL

## 2024-11-13 DIAGNOSIS — E34.9 ENDOCRINE DISORDER, UNSPECIFIED: ICD-10-CM

## 2024-11-13 PROCEDURE — 74150 CT ABDOMEN W/O CONTRAST: CPT

## 2024-12-09 ENCOUNTER — LAB REQUISITION (OUTPATIENT)
Dept: LAB | Facility: CLINIC | Age: 24
End: 2024-12-09

## 2024-12-09 DIAGNOSIS — E34.9 ENDOCRINE DISORDER, UNSPECIFIED: ICD-10-CM

## 2024-12-09 PROCEDURE — 82627 DEHYDROEPIANDROSTERONE: CPT | Performed by: OBSTETRICS & GYNECOLOGY

## 2024-12-10 LAB — DHEA-S SERPL-MCNC: 551 UG/DL (ref 35–430)

## 2025-01-26 ENCOUNTER — HEALTH MAINTENANCE LETTER (OUTPATIENT)
Age: 25
End: 2025-01-26

## (undated) DEVICE — LINEN TOWEL PACK X5 5464

## (undated) DEVICE — COVER CAMERA IN-LIGHT DISP LT-C02

## (undated) DEVICE — SU VICRYL 6-0 S-29 12" J556G

## (undated) DEVICE — STRAP KNEE/BODY 31143004

## (undated) DEVICE — ESU HOLSTER PLASTIC DISP E2400

## (undated) DEVICE — SYR 03ML SLIP TIP W/O NDL LATEX FREE 309656

## (undated) DEVICE — GLOVE BIOGEL PI MICRO SZ 6.5 48565

## (undated) DEVICE — POSITIONER ARMBOARD FOAM 1PAIR LF FP-ARMB1

## (undated) DEVICE — PACK MINOR EYE

## (undated) DEVICE — ESU CORD BIPOLAR GREEN 10-4000

## (undated) DEVICE — EYE PREP BETADINE 5% SOLUTION 30ML 0065-0411-30

## (undated) DEVICE — SOL WATER IRRIG 1000ML BOTTLE 2F7114

## (undated) DEVICE — SU VICRYL 8-0 TG140-8DA 12" J548G

## (undated) RX ORDER — FENTANYL CITRATE 50 UG/ML
INJECTION, SOLUTION INTRAMUSCULAR; INTRAVENOUS
Status: DISPENSED
Start: 2024-05-23

## (undated) RX ORDER — ACETAMINOPHEN 325 MG/1
TABLET ORAL
Status: DISPENSED
Start: 2024-05-23

## (undated) RX ORDER — PROPOFOL 10 MG/ML
INJECTION, EMULSION INTRAVENOUS
Status: DISPENSED
Start: 2024-05-23